# Patient Record
Sex: MALE | Race: WHITE | NOT HISPANIC OR LATINO | Employment: PART TIME | URBAN - NONMETROPOLITAN AREA
[De-identification: names, ages, dates, MRNs, and addresses within clinical notes are randomized per-mention and may not be internally consistent; named-entity substitution may affect disease eponyms.]

---

## 2017-01-09 RX ORDER — CLOPIDOGREL BISULFATE 75 MG/1
TABLET ORAL
Qty: 30 TABLET | Refills: 6 | Status: SHIPPED | OUTPATIENT
Start: 2017-01-09 | End: 2017-07-24 | Stop reason: SDUPTHER

## 2017-01-27 ENCOUNTER — LAB (OUTPATIENT)
Dept: LAB | Facility: HOSPITAL | Age: 62
End: 2017-01-27

## 2017-01-27 DIAGNOSIS — E08.22 DIABETES MELLITUS DUE TO UNDERLYING CONDITION WITH STAGE 1 CHRONIC KIDNEY DISEASE, WITHOUT LONG-TERM CURRENT USE OF INSULIN (HCC): Primary | ICD-10-CM

## 2017-01-27 DIAGNOSIS — I25.708 CORONARY ARTERY DISEASE INVOLVING CORONARY BYPASS GRAFT OF NATIVE HEART WITH OTHER FORMS OF ANGINA PECTORIS (HCC): ICD-10-CM

## 2017-01-27 DIAGNOSIS — I10 ESSENTIAL HYPERTENSION: ICD-10-CM

## 2017-01-27 DIAGNOSIS — I25.5 ISCHEMIC CARDIOMYOPATHY: ICD-10-CM

## 2017-01-27 DIAGNOSIS — E78.2 MIXED HYPERLIPIDEMIA: ICD-10-CM

## 2017-01-27 DIAGNOSIS — N18.1 DIABETES MELLITUS DUE TO UNDERLYING CONDITION WITH STAGE 1 CHRONIC KIDNEY DISEASE, WITHOUT LONG-TERM CURRENT USE OF INSULIN (HCC): Primary | ICD-10-CM

## 2017-01-27 LAB
ALBUMIN SERPL-MCNC: 4.6 G/DL (ref 3.4–4.8)
ALBUMIN/GLOB SERPL: 1.5 G/DL (ref 1.1–1.8)
ALP SERPL-CCNC: 69 U/L (ref 38–126)
ALT SERPL W P-5'-P-CCNC: 39 U/L (ref 21–72)
ANION GAP SERPL CALCULATED.3IONS-SCNC: 16 MMOL/L (ref 5–15)
ARTICHOKE IGE QN: 82 MG/DL (ref 1–129)
AST SERPL-CCNC: 22 U/L (ref 17–59)
BILIRUB SERPL-MCNC: 0.8 MG/DL (ref 0.2–1.3)
BUN BLD-MCNC: 16 MG/DL (ref 7–21)
BUN/CREAT SERPL: 21.6 (ref 7–25)
CALCIUM SPEC-SCNC: 9.8 MG/DL (ref 8.4–10.2)
CHLORIDE SERPL-SCNC: 97 MMOL/L (ref 95–110)
CHOLEST SERPL-MCNC: 160 MG/DL (ref 0–199)
CO2 SERPL-SCNC: 22 MMOL/L (ref 22–31)
CREAT BLD-MCNC: 0.74 MG/DL (ref 0.7–1.3)
GFR SERPL CREATININE-BSD FRML MDRD: 108 ML/MIN/1.73 (ref 56–130)
GLOBULIN UR ELPH-MCNC: 3 GM/DL (ref 2.3–3.5)
GLUCOSE BLD-MCNC: 272 MG/DL (ref 60–100)
HBA1C MFR BLD: 9.76 % (ref 4–5.6)
HDLC SERPL-MCNC: 47 MG/DL (ref 60–200)
LDLC/HDLC SERPL: 1.66 {RATIO} (ref 0–3.55)
POTASSIUM BLD-SCNC: 4.6 MMOL/L (ref 3.5–5.1)
PROT SERPL-MCNC: 7.6 G/DL (ref 6.3–8.6)
SODIUM BLD-SCNC: 135 MMOL/L (ref 137–145)
TRIGL SERPL-MCNC: 176 MG/DL (ref 20–199)

## 2017-01-27 PROCEDURE — 36415 COLL VENOUS BLD VENIPUNCTURE: CPT

## 2017-01-27 PROCEDURE — 80061 LIPID PANEL: CPT | Performed by: INTERNAL MEDICINE

## 2017-01-27 PROCEDURE — 83036 HEMOGLOBIN GLYCOSYLATED A1C: CPT | Performed by: INTERNAL MEDICINE

## 2017-01-27 PROCEDURE — 80162 ASSAY OF DIGOXIN TOTAL: CPT | Performed by: INTERNAL MEDICINE

## 2017-01-27 PROCEDURE — 80053 COMPREHEN METABOLIC PANEL: CPT | Performed by: INTERNAL MEDICINE

## 2017-01-30 ENCOUNTER — TRANSCRIBE ORDERS (OUTPATIENT)
Dept: LAB | Facility: HOSPITAL | Age: 62
End: 2017-01-30

## 2017-01-30 ENCOUNTER — APPOINTMENT (OUTPATIENT)
Dept: LAB | Facility: HOSPITAL | Age: 62
End: 2017-01-30

## 2017-01-30 DIAGNOSIS — I25.5 ISCHEMIC CARDIOMYOPATHY: Primary | ICD-10-CM

## 2017-01-30 LAB — DIGOXIN SERPL-MCNC: 0.9 NG/ML (ref 0.8–2)

## 2017-01-30 PROCEDURE — 36415 COLL VENOUS BLD VENIPUNCTURE: CPT | Performed by: INTERNAL MEDICINE

## 2017-01-30 PROCEDURE — 80162 ASSAY OF DIGOXIN TOTAL: CPT | Performed by: INTERNAL MEDICINE

## 2017-01-30 PROCEDURE — 84550 ASSAY OF BLOOD/URIC ACID: CPT | Performed by: INTERNAL MEDICINE

## 2017-04-05 ENCOUNTER — CLINICAL SUPPORT (OUTPATIENT)
Dept: CARDIOLOGY | Facility: CLINIC | Age: 62
End: 2017-04-05

## 2017-04-05 DIAGNOSIS — I25.5 ISCHEMIC CARDIOMYOPATHY: Primary | ICD-10-CM

## 2017-04-05 PROCEDURE — 93289 INTERROG DEVICE EVAL HEART: CPT | Performed by: INTERNAL MEDICINE

## 2017-04-06 NOTE — PROGRESS NOTES
ICD interrogation report on Andre Javier :    Mr. Javier is a 61 yr old male with Ischemic Cardiomyopathy s/p ICD placement on 11/14/2013. Pt has a Medtronic Protecta DR [id number]    Battery Voltage was adequate at 3.10 V and charge time 9.6 sec.    Patient was atrial paced 49.4% of the time. Bradycardia parameters; Mode AAIR/ DDDR  Tachycardia parameters were programmed in three zones. VT-1 at 167 bpm, VT-2 at 214 bpm, and VF at 188 bpm.    Atrial lead sensing was 5.1 mV and threshold 0.25 V @ 0.4 ms and impedence 532 ohms.  Ventricular sensing was 14.6 mV and threshold 0.75 V @ 0.4 ms and impedence 456 ohms. HVLI was 83 ohms.   3 supraventricular (longest 1 min, 50 sec) and 3 VT (longest 5 sec)    ICD functioning well and no changes in parameters made.

## 2017-06-10 RX ORDER — DIGOXIN 125 MCG
TABLET ORAL
Qty: 30 TABLET | Refills: 5 | Status: CANCELLED | OUTPATIENT
Start: 2017-06-10

## 2017-06-12 RX ORDER — DIGOXIN 125 MCG
125 TABLET ORAL
Qty: 30 TABLET | Refills: 7 | Status: SHIPPED | OUTPATIENT
Start: 2017-06-12 | End: 2019-01-31 | Stop reason: SDUPTHER

## 2017-07-17 ENCOUNTER — OFFICE VISIT (OUTPATIENT)
Dept: CARDIOLOGY | Facility: CLINIC | Age: 62
End: 2017-07-17

## 2017-07-17 VITALS
BODY MASS INDEX: 21.49 KG/M2 | WEIGHT: 129 LBS | HEART RATE: 72 BPM | DIASTOLIC BLOOD PRESSURE: 82 MMHG | HEIGHT: 65 IN | SYSTOLIC BLOOD PRESSURE: 142 MMHG

## 2017-07-17 DIAGNOSIS — N18.1 DIABETES MELLITUS DUE TO UNDERLYING CONDITION WITH STAGE 1 CHRONIC KIDNEY DISEASE, WITHOUT LONG-TERM CURRENT USE OF INSULIN (HCC): ICD-10-CM

## 2017-07-17 DIAGNOSIS — I25.5 ISCHEMIC CARDIOMYOPATHY: Primary | ICD-10-CM

## 2017-07-17 DIAGNOSIS — E08.22 DIABETES MELLITUS DUE TO UNDERLYING CONDITION WITH STAGE 1 CHRONIC KIDNEY DISEASE, WITHOUT LONG-TERM CURRENT USE OF INSULIN (HCC): ICD-10-CM

## 2017-07-17 DIAGNOSIS — I25.708 CORONARY ARTERY DISEASE INVOLVING CORONARY BYPASS GRAFT OF NATIVE HEART WITH OTHER FORMS OF ANGINA PECTORIS (HCC): ICD-10-CM

## 2017-07-17 DIAGNOSIS — R06.02 SOB (SHORTNESS OF BREATH): ICD-10-CM

## 2017-07-17 DIAGNOSIS — E78.2 MIXED HYPERLIPIDEMIA: ICD-10-CM

## 2017-07-17 DIAGNOSIS — I10 ESSENTIAL HYPERTENSION: ICD-10-CM

## 2017-07-17 PROCEDURE — 99214 OFFICE O/P EST MOD 30 MIN: CPT | Performed by: INTERNAL MEDICINE

## 2017-07-17 RX ORDER — GLIMEPIRIDE 1 MG/1
1 TABLET ORAL
COMMUNITY
End: 2018-01-17 | Stop reason: DRUGHIGH

## 2017-07-17 RX ORDER — GLIMEPIRIDE 2 MG/1
2 TABLET ORAL 2 TIMES DAILY
COMMUNITY

## 2017-07-17 NOTE — PROGRESS NOTES
[facility]  OFFICE NOTE    Andre Pettitp  62 y.o. male    07/17/2017  1. Ischemic cardiomyopathy    2. Coronary artery disease involving coronary bypass graft of native heart with other forms of angina pectoris    3. Essential hypertension    4. Mixed hyperlipidemia    5. Diabetes mellitus due to underlying condition with stage 1 chronic kidney disease, without long-term current use of insulin    6. SOB (shortness of breath)        Chief complaint - Follow-up ischemic cardiomyopathy      History of present Illness- 62-year-old gentleman with ischemic cardio myopathy last EF was 40%, he has shortness of breath NYHA class II, his last A1c was 9.2, since then he had seen Dr. Nieves and he had put him on glimepiride and his sugars are better able to do A1c today.  He is going to get couple of other labs dig level.  He denies any chest pain no dizziness or TIA symptoms.  No GI symptoms.              Allergies   Allergen Reactions   • Niacin And Related      Other reaction(s): Other (See Comments)  Flushing         Past Medical History:   Diagnosis Date   • Diabetes mellitus    • Hyperlipidemia    • Hypertension    • Ischemic cardiomyopathy    • Myocardial infarction          Past Surgical History:   Procedure Laterality Date   • CARDIAC CATHETERIZATION     • CARDIAC DEFIBRILLATOR PLACEMENT     • CARDIAC SURGERY     • CAROTID STENT     • CORONARY ARTERY BYPASS GRAFT     • INSERT / REPLACE / REMOVE PACEMAKER           No family history on file.      Social History     Social History   • Marital status: Single     Spouse name: N/A   • Number of children: N/A   • Years of education: N/A     Occupational History   • Not on file.     Social History Main Topics   • Smoking status: Never Smoker   • Smokeless tobacco: Never Used   • Alcohol use No   • Drug use: No   • Sexual activity: Defer     Other Topics Concern   • Not on file     Social History Narrative         Current Outpatient  "Prescriptions   Medication Sig Dispense Refill   • aspirin 81 MG EC tablet Take 81 mg by mouth Daily.     • carvedilol (COREG) 12.5 MG tablet Take 1 tablet by mouth 2 (Two) Times a Day With Meals. 60 tablet 6   • clopidogrel (PLAVIX) 75 MG tablet TAKE 1 TABLET BY MOUTH DAILY. 30 tablet 6   • CRESTOR 20 MG tablet Take 1 tablet by mouth Daily. 30 tablet 6   • digoxin (LANOXIN) 125 MCG tablet Take 1 tablet by mouth Daily. 30 tablet 7   • glimepiride (AMARYL) 1 MG tablet Take 1 mg by mouth Every Morning Before Breakfast.     • glimepiride (AMARYL) 2 MG tablet Take 2 mg by mouth Every Evening.     • metFORMIN (GLUCOPHAGE) 500 MG tablet Take 500 mg by mouth 2 (Two) Times a Day With Meals.     • valsartan (DIOVAN) 80 MG tablet Take 1 tablet by mouth 2 (Two) Times a Day. 60 tablet 10     No current facility-administered medications for this visit.          Review of Systems     Constitution: Denies any fatigue, fever or chills    HENT: Denies any headache, hearing impairment,     Eyes: Denies any blurring of vision, or photophobia     Cardivascular - As per history of present illness     Respiratory system-Shortness of breath NYHA class II   sleep apnea.     Endocrine:   history of hyperlipidemia, diabetes,                        Musculoskeletal:  No history of arthritis with musculoskeletal problems    Gastrointestinal: No nausea, vomiting, or melena    Genitourinary: No dysuria or hematuria    Neurological:   No history of seizure disorder, stroke, memory problems    Psychiatric/Behavioral:        No history of depression,  No history of bipolar disorder or schizophrenia     Hematological- no history of easy bruising or any bleeding diathesis            OBJECTIVE    /82  Pulse 72  Ht 65\" (165.1 cm)  Wt 129 lb (58.5 kg)  BMI 21.47 kg/m2      Physical Exam     Constitutional: is oriented to person, place, and time.     Skin-warm and dry, ICD site is okay    Well developed and nourished in no acute distress    "   Head: Normocephalic and atraumatic.     Eyes: Pupils are equal, round, and reactive to light.     Neck: Neck supple. No bruit in the carotids, no elevation of JVD    Cardiovascular: [place] in the fifth intercostal space   Regular rate, and  Rhythm,    S1 greater than S2, no S3 or S4, no gallop     Pulmonary/Chest:   Air  Entry is equal on both sides  No wheezing or crackles,      Abdominal: Soft.  No hepatosplenomegaly, bowel sounds are present    Musculoskeletal: No kyphoscoliosis, no significant thickening of the joints    Neurological: is alert and oriented to person, place, and time.    cranial nerve are intact .   No motor or sensory deficit    Extremities-no edema, no radial femoral delay      Psychiatric: He has a normal mood and affect.                  His behavior is normal.           Procedures      Lab Results   Component Value Date    WBC 8.1 02/15/2015    HGB 14.8 02/15/2015    HCT 40.5 02/15/2015    MCV 84.6 02/15/2015     02/15/2015     Lab Results   Component Value Date    GLUCOSE 272 (H) 01/27/2017    BUN 16 01/27/2017    CREATININE 0.74 01/27/2017    EGFRIFNONA 108 01/27/2017    BCR 21.6 01/27/2017    CO2 22.0 01/27/2017    CALCIUM 9.8 01/27/2017    ALBUMIN 4.60 01/27/2017    LABIL2 1.5 01/27/2017    AST 22 01/27/2017    ALT 39 01/27/2017     Lab Results   Component Value Date    CHOL 160 01/27/2017     Lab Results   Component Value Date    TRIG 176 01/27/2017     Lab Results   Component Value Date    HDL 47 (L) 01/27/2017     No results found for: LDLCALC  No results found for: LDL  No results found for: HDLLDLRATIO  No components found for: CHOLHDL  Lab Results   Component Value Date    HGBA1C 9.76 (H) 01/27/2017     No results found for: TSH, [id number]               A/P    Ischemic cardiomyopathy-on optimal medical therapy with Coreg, Diovan, digoxin and aspirin.  He takes Plavix also.  He has NYHA class II symptoms of dyspnea on exertion.  We'll check all the labs today.    History of  nonsustained VT-stable now since his EF is improved from 20% to 40%.  An ICD function is okay.    Diabetes-last A1c was 9.7 we'll check another A1c today as his blood sugars are been running good.    Hyperlipidemia on Crestor doing well.    Follow-up in 6 months              This document has been electronically signed by Ian Arora MD on July 17, 2017 8:42 AM       EMR Dragon/Transcription disclaimer:   Some of this note may be an electronic transcription/translation of spoken language to printed text. The electronic translation of spoken language may permit erroneous, or at times, nonsensical words or phrases to be inadvertently transcribed; Although I have reviewed the note for such errors, some may still exist.

## 2017-07-24 RX ORDER — CLOPIDOGREL BISULFATE 75 MG/1
TABLET ORAL
Qty: 30 TABLET | Refills: 5 | Status: SHIPPED | OUTPATIENT
Start: 2017-07-24 | End: 2018-01-20 | Stop reason: SDUPTHER

## 2017-12-06 ENCOUNTER — CLINICAL SUPPORT (OUTPATIENT)
Dept: CARDIOLOGY | Facility: CLINIC | Age: 62
End: 2017-12-06

## 2017-12-06 DIAGNOSIS — I25.5 ISCHEMIC CARDIOMYOPATHY: Primary | ICD-10-CM

## 2017-12-06 LAB
BH CV ECHO MEAS - ACS: 1.7 CM
BH CV ECHO MEAS - AO MAX PG (FULL): 2.2 MMHG
BH CV ECHO MEAS - AO MAX PG: 8.1 MMHG
BH CV ECHO MEAS - AO MEAN PG (FULL): 1 MMHG
BH CV ECHO MEAS - AO MEAN PG: 4 MMHG
BH CV ECHO MEAS - AO ROOT AREA (BSA CORRECTED): 1.8
BH CV ECHO MEAS - AO ROOT AREA: 6.6 CM^2
BH CV ECHO MEAS - AO ROOT DIAM: 2.9 CM
BH CV ECHO MEAS - AO V2 MAX: 142 CM/SEC
BH CV ECHO MEAS - AO V2 MEAN: 98.4 CM/SEC
BH CV ECHO MEAS - AO V2 VTI: 31.5 CM
BH CV ECHO MEAS - AVA(I,A): 1.7 CM^2
BH CV ECHO MEAS - AVA(I,D): 1.7 CM^2
BH CV ECHO MEAS - AVA(V,A): 1.9 CM^2
BH CV ECHO MEAS - AVA(V,D): 1.9 CM^2
BH CV ECHO MEAS - BSA(HAYCOCK): 1.6 M^2
BH CV ECHO MEAS - BSA: 1.6 M^2
BH CV ECHO MEAS - BZI_BMI: 21.5 KILOGRAMS/M^2
BH CV ECHO MEAS - BZI_METRIC_HEIGHT: 165.1 CM
BH CV ECHO MEAS - BZI_METRIC_WEIGHT: 58.5 KG
BH CV ECHO MEAS - EDV(CUBED): 148.9 ML
BH CV ECHO MEAS - EDV(TEICH): 135.3 ML
BH CV ECHO MEAS - EF(CUBED): 57 %
BH CV ECHO MEAS - EF(MOD-SP4): 55 %
BH CV ECHO MEAS - EF(TEICH): 48.3 %
BH CV ECHO MEAS - EPSS: 1.5 CM
BH CV ECHO MEAS - ESV(CUBED): 64 ML
BH CV ECHO MEAS - ESV(TEICH): 70 ML
BH CV ECHO MEAS - FS: 24.5 %
BH CV ECHO MEAS - IVS/LVPW: 0.67
BH CV ECHO MEAS - IVSD: 0.8 CM
BH CV ECHO MEAS - LA DIMENSION: 3.1 CM
BH CV ECHO MEAS - LA/AO: 1.1
BH CV ECHO MEAS - LV MASS(C)D: 200.4 GRAMS
BH CV ECHO MEAS - LV MASS(C)DI: 122.1 GRAMS/M^2
BH CV ECHO MEAS - LV MAX PG: 5.9 MMHG
BH CV ECHO MEAS - LV MEAN PG: 3 MMHG
BH CV ECHO MEAS - LV V1 MAX: 121 CM/SEC
BH CV ECHO MEAS - LV V1 MEAN: 76.2 CM/SEC
BH CV ECHO MEAS - LV V1 VTI: 24.1 CM
BH CV ECHO MEAS - LVIDD: 5.3 CM
BH CV ECHO MEAS - LVIDS: 4 CM
BH CV ECHO MEAS - LVOT AREA (M): 2.3 CM^2
BH CV ECHO MEAS - LVOT AREA: 2.3 CM^2
BH CV ECHO MEAS - LVOT DIAM: 1.7 CM
BH CV ECHO MEAS - LVPWD: 1.2 CM
BH CV ECHO MEAS - MR MAX PG: 61.2 MMHG
BH CV ECHO MEAS - MR MAX VEL: 391 CM/SEC
BH CV ECHO MEAS - MV A MAX VEL: 66.6 CM/SEC
BH CV ECHO MEAS - MV E MAX VEL: 105 CM/SEC
BH CV ECHO MEAS - MV E/A: 1.6
BH CV ECHO MEAS - PA MAX PG: 10.9 MMHG
BH CV ECHO MEAS - PA MEAN PG: 5 MMHG
BH CV ECHO MEAS - PA V2 MAX: 165 CM/SEC
BH CV ECHO MEAS - PA V2 MEAN: 98.4 CM/SEC
BH CV ECHO MEAS - PA V2 VTI: 32.9 CM
BH CV ECHO MEAS - RAP SYSTOLE: 10 MMHG
BH CV ECHO MEAS - RVDD: 2.1 CM
BH CV ECHO MEAS - RVSP: 38 MMHG
BH CV ECHO MEAS - SI(AO): 126.7 ML/M^2
BH CV ECHO MEAS - SI(CUBED): 51.7 ML/M^2
BH CV ECHO MEAS - SI(LVOT): 33.3 ML/M^2
BH CV ECHO MEAS - SI(TEICH): 39.8 ML/M^2
BH CV ECHO MEAS - SV(AO): 208.1 ML
BH CV ECHO MEAS - SV(CUBED): 84.9 ML
BH CV ECHO MEAS - SV(LVOT): 54.7 ML
BH CV ECHO MEAS - SV(TEICH): 65.3 ML
BH CV ECHO MEAS - TR MAX VEL: 264.3 CM/SEC
LV EF 2D ECHO EST: 50 %

## 2017-12-06 PROCEDURE — 93289 INTERROG DEVICE EVAL HEART: CPT | Performed by: INTERNAL MEDICINE

## 2017-12-06 NOTE — PROGRESS NOTES
Mr. Javier is a 61 yr old male with Ischemic Cardiomyopathy s/p ICD placement on 11/14/2013. Pt has a Medtronic Protecta DR [id number]    Battery status is adequate at 3.06V and charge time was 9.9 seconds.  The patient was atrial paced 63.8% and pacing mode was AAI R=DDDR  The tachycardia parameters were programmed in 3 zones VT at 167-1 88 bpm, FVT at 188-214bpm, VF >188 bpm  Atrial lead sensing was 4.9 mV and threshold 0.5 V at 0.4 ms and impedance 532 ohms  Ventricular lead sensing was 15.8 mV and threshold 0.7 5V at 0.4 ms and impedance 454 ohms.  No atrial episodes were noted.  Few short nonsustained ventricle ectopics were noted at longest lasting 2 seconds  No changes made.  ICD functioning well

## 2018-01-17 ENCOUNTER — OFFICE VISIT (OUTPATIENT)
Dept: CARDIOLOGY | Facility: CLINIC | Age: 63
End: 2018-01-17

## 2018-01-17 VITALS
HEART RATE: 86 BPM | DIASTOLIC BLOOD PRESSURE: 83 MMHG | WEIGHT: 125 LBS | SYSTOLIC BLOOD PRESSURE: 160 MMHG | BODY MASS INDEX: 20.83 KG/M2 | HEIGHT: 65 IN

## 2018-01-17 DIAGNOSIS — I25.5 ISCHEMIC CARDIOMYOPATHY: ICD-10-CM

## 2018-01-17 DIAGNOSIS — I10 ESSENTIAL HYPERTENSION: ICD-10-CM

## 2018-01-17 DIAGNOSIS — I25.708 CORONARY ARTERY DISEASE INVOLVING CORONARY BYPASS GRAFT OF NATIVE HEART WITH OTHER FORMS OF ANGINA PECTORIS (HCC): Primary | ICD-10-CM

## 2018-01-17 DIAGNOSIS — E78.2 MIXED HYPERLIPIDEMIA: ICD-10-CM

## 2018-01-17 PROCEDURE — 99213 OFFICE O/P EST LOW 20 MIN: CPT | Performed by: INTERNAL MEDICINE

## 2018-01-17 NOTE — PROGRESS NOTES
[facility]  OFFICE NOTE    Andre Javier  62 y.o. male    01/17/2018  1. Coronary artery disease involving coronary bypass graft of native heart with other forms of angina pectoris    2. Mixed hyperlipidemia    3. Essential hypertension    4. Ischemic cardiomyopathy        Chief complaint - Follow-up ischemic cardiomyopathy      History of present Illness- 62-year-old gentleman with ischemic cardio myopathy last EF -50%In July 2017.  He is doing better he is NYHA class I to class II symptoms of dyspnea on exertion.  He is a diabetic and is on amaryl and metformin.  He denies any chest pain or shortness of breath at rest.  His blood pressure is usually well controlled, today it is high.  Denies any GI symptoms or CNS symptoms          Allergies   Allergen Reactions   • Niacin And Related      Other reaction(s): Other (See Comments)  Flushing         Past Medical History:   Diagnosis Date   • Diabetes mellitus    • Hyperlipidemia    • Hypertension    • Ischemic cardiomyopathy    • Myocardial infarction          Past Surgical History:   Procedure Laterality Date   • CARDIAC CATHETERIZATION     • CARDIAC DEFIBRILLATOR PLACEMENT     • CARDIAC SURGERY     • CAROTID STENT     • CORONARY ARTERY BYPASS GRAFT     • INSERT / REPLACE / REMOVE PACEMAKER           History reviewed. No pertinent family history.      Social History     Social History   • Marital status: Single     Spouse name: N/A   • Number of children: N/A   • Years of education: N/A     Occupational History   • Not on file.     Social History Main Topics   • Smoking status: Never Smoker   • Smokeless tobacco: Never Used   • Alcohol use No   • Drug use: No   • Sexual activity: Defer     Other Topics Concern   • Not on file     Social History Narrative         Current Outpatient Prescriptions   Medication Sig Dispense Refill   • aspirin 81 MG EC tablet Take 81 mg by mouth Daily.     • carvedilol (COREG) 12.5 MG tablet Take 1 tablet  "by mouth 2 (Two) Times a Day With Meals. 60 tablet 6   • clopidogrel (PLAVIX) 75 MG tablet TAKE 1 TABLET BY MOUTH DAILY. 30 tablet 5   • CRESTOR 20 MG tablet Take 1 tablet by mouth Daily. 30 tablet 6   • digoxin (LANOXIN) 125 MCG tablet Take 1 tablet by mouth Daily. 30 tablet 7   • glimepiride (AMARYL) 2 MG tablet Take 2 mg by mouth Every Evening.     • metFORMIN (GLUCOPHAGE) 500 MG tablet Take 500 mg by mouth 2 (Two) Times a Day With Meals.     • valsartan (DIOVAN) 80 MG tablet Take 1 tablet by mouth 2 (Two) Times a Day. 60 tablet 10     No current facility-administered medications for this visit.          Review of Systems     Constitution: Denies any fatigue, fever or chills    HENT: Denies any headache, hearing impairment,     Eyes: Denies any blurring of vision, or photophobia     Cardivascular - As per history of present illness     Respiratory system-Shortness of breath NYHA class II   sleep apnea.     Endocrine:   history of hyperlipidemia, diabetes,                        Musculoskeletal:  No history of arthritis with musculoskeletal problems    Gastrointestinal: No nausea, vomiting, or melena    Genitourinary: No dysuria or hematuria    Neurological:   No history of seizure disorder, stroke, memory problems    Psychiatric/Behavioral:        No history of depression,  No history of bipolar disorder or schizophrenia     Hematological- no history of easy bruising or any bleeding diathesis            OBJECTIVE    /83  Pulse 86  Ht 165.1 cm (65\")  Wt 56.7 kg (125 lb)  BMI 20.8 kg/m2      Physical Exam     Constitutional: is oriented to person, place, and time.     Skin-warm and dry, ICD site is okay    Well developed and nourished in no acute distress      Head: Normocephalic and atraumatic.     Eyes: Pupils are equal, round, and reactive to light.     Neck: Neck supple. No bruit in the carotids, no elevation of JVD    Cardiovascular: [place] in the fifth intercostal space   Regular rate, and  Rhythm,    " S1 greater than S2,     Pulmonary/Chest:   Air  Entry is equal on both sides  No wheezing or crackles,      Abdominal: Soft.  No hepatosplenomegaly, bowel sounds are present    Musculoskeletal: No kyphoscoliosis, no significant thickening of the joints    Neurological: is alert and oriented to person, place, and time.    cranial nerve are intact .   No motor or sensory deficit    Extremities-no edema, no radial femoral delay      Psychiatric: He has a normal mood and affect.                  His behavior is normal.           Procedures      Lab Results   Component Value Date    WBC 8.1 02/15/2015    HGB 14.8 02/15/2015    HCT 40.5 02/15/2015    MCV 84.6 02/15/2015     02/15/2015     Lab Results   Component Value Date    GLUCOSE 272 (H) 01/27/2017    BUN 16 01/27/2017    CREATININE 0.74 01/27/2017    EGFRIFNONA 108 01/27/2017    BCR 21.6 01/27/2017    CO2 22.0 01/27/2017    CALCIUM 9.8 01/27/2017    ALBUMIN 4.60 01/27/2017    LABIL2 1.5 01/27/2017    AST 22 01/27/2017    ALT 39 01/27/2017     Lab Results   Component Value Date    CHOL 160 01/27/2017     Lab Results   Component Value Date    TRIG 176 01/27/2017     Lab Results   Component Value Date    HDL 47 (L) 01/27/2017     No results found for: LDLCALC  No results found for: LDL  No results found for: HDLLDLRATIO  No components found for: CHOLHDL  Lab Results   Component Value Date    HGBA1C 9.76 (H) 01/27/2017     No results found for: TSH, [id number]               A/P    Ischemic cardiomyopathy-on optimal medical therapy with Coreg, Diovan, digoxin and aspirin.  He takes Plavix also.  He has NYHA class II symptoms of dyspnea on exertion.      History of nonsustained VT-stable now since his EF is improved to 50%.  An ICD function is okay.    Diabetes-on oral therapy    Hyperlipidemia on Crestor doing well.    Follow-up in 6 months              This document has been electronically signed by Ian Arora MD on January 17, 2018 11:21 AM       EMR  Dragon/Transcription disclaimer:   Some of this note may be an electronic transcription/translation of spoken language to printed text. The electronic translation of spoken language may permit erroneous, or at times, nonsensical words or phrases to be inadvertently transcribed; Although I have reviewed the note for such errors, some may still exist.

## 2018-01-22 RX ORDER — CLOPIDOGREL BISULFATE 75 MG/1
TABLET ORAL
Qty: 30 TABLET | Refills: 6 | Status: SHIPPED | OUTPATIENT
Start: 2018-01-22 | End: 2018-10-19 | Stop reason: SDUPTHER

## 2018-04-23 RX ORDER — CARVEDILOL 12.5 MG/1
TABLET ORAL
Qty: 180 TABLET | Refills: 2 | Status: SHIPPED | OUTPATIENT
Start: 2018-04-23 | End: 2018-05-22 | Stop reason: SDUPTHER

## 2018-05-12 PROBLEM — J06.9 ACUTE UPPER RESPIRATORY INFECTION: Status: ACTIVE | Noted: 2018-05-12

## 2018-05-22 RX ORDER — CARVEDILOL 12.5 MG/1
12.5 TABLET ORAL 2 TIMES DAILY WITH MEALS
Qty: 180 TABLET | Refills: 2 | Status: SHIPPED | OUTPATIENT
Start: 2018-05-22 | End: 2018-11-19 | Stop reason: SDUPTHER

## 2018-06-06 ENCOUNTER — CLINICAL SUPPORT (OUTPATIENT)
Dept: CARDIOLOGY | Facility: CLINIC | Age: 63
End: 2018-06-06

## 2018-06-06 DIAGNOSIS — Z95.810 AICD (AUTOMATIC CARDIOVERTER/DEFIBRILLATOR) PRESENT: ICD-10-CM

## 2018-06-06 DIAGNOSIS — I25.5 ISCHEMIC CARDIOMYOPATHY: Primary | ICD-10-CM

## 2018-06-06 PROCEDURE — 93289 INTERROG DEVICE EVAL HEART: CPT | Performed by: NURSE PRACTITIONER

## 2018-06-06 NOTE — PROGRESS NOTES
Pacemaker Evaluation Report    June 6, 2018    Primary Cardiologist: Dr. Arora  Implanting MD: Dr. Wright  :Medtronic Model: [id number] Serial Number: [id number]  Implant date: 11/14/2013    Reason for evaluation:routine Office  Cardiac device indication(s): cardiomyopathy, ischemic    Battery  AMIRA: 3.03V   Last charge: 10.1 Sec on 4/13/18    Interrogation Results  Atrial sensing: P wave: 5.9 mV  Atrial capture: 0.75 V @ 0.40 ms   Atrial lead impedance: 532 ohms  Ventricular sensing: R wave: 16.8 mV  Ventricular capture: 0.75 V @ 0.40 ms  Ventricular lead impedance: right  456 ohms; HV  50/83 ohms    Parameters  Mode: AAIR<=>DDDR  Base Rate: 60/140    Diagnostic Data  Atrial paced: 61 % Ventricular paced: 4 %  Mode switch: %  AT/AF [place]: <0.1  AHR,VHR: NS-VT 4 episodes, SVT 2    Changes made: no changes    Conclusions: normal device function, 3 mo F/U recommended, unable to come because of insurance. He will make a 6 mo appt.    Assessment:   Diagnosis Plan   1. Ischemic cardiomyopathy     2. AICD (automatic cardioverter/defibrillator) present             This document has been electronically signed by DEVORAH Santiago on June 6, 2018 6:17 PM

## 2018-06-14 PROBLEM — E78.00 HYPERCHOLESTEROLEMIA: Status: ACTIVE | Noted: 2017-02-03

## 2018-06-14 PROBLEM — I25.10 CORONARY ARTERY DISEASE INVOLVING NATIVE CORONARY ARTERY OF NATIVE HEART WITHOUT ANGINA PECTORIS: Status: ACTIVE | Noted: 2017-02-03

## 2018-10-22 ENCOUNTER — OFFICE VISIT (OUTPATIENT)
Dept: CARDIOLOGY | Facility: CLINIC | Age: 63
End: 2018-10-22

## 2018-10-22 VITALS
WEIGHT: 127 LBS | HEART RATE: 87 BPM | SYSTOLIC BLOOD PRESSURE: 130 MMHG | BODY MASS INDEX: 21.16 KG/M2 | HEIGHT: 65 IN | DIASTOLIC BLOOD PRESSURE: 88 MMHG

## 2018-10-22 DIAGNOSIS — I10 ESSENTIAL HYPERTENSION: ICD-10-CM

## 2018-10-22 DIAGNOSIS — E78.2 MIXED HYPERLIPIDEMIA: ICD-10-CM

## 2018-10-22 DIAGNOSIS — I25.5 ISCHEMIC CARDIOMYOPATHY: Primary | ICD-10-CM

## 2018-10-22 DIAGNOSIS — I25.708 CORONARY ARTERY DISEASE INVOLVING CORONARY BYPASS GRAFT OF NATIVE HEART WITH OTHER FORMS OF ANGINA PECTORIS (HCC): ICD-10-CM

## 2018-10-22 DIAGNOSIS — E11.9 TYPE 2 DIABETES MELLITUS WITHOUT COMPLICATION, WITHOUT LONG-TERM CURRENT USE OF INSULIN (HCC): ICD-10-CM

## 2018-10-22 PROCEDURE — 99214 OFFICE O/P EST MOD 30 MIN: CPT | Performed by: INTERNAL MEDICINE

## 2018-10-22 RX ORDER — LOSARTAN POTASSIUM 50 MG/1
50 TABLET ORAL DAILY
Qty: 90 TABLET | Refills: 4 | Status: SHIPPED | OUTPATIENT
Start: 2018-10-22

## 2018-10-22 RX ORDER — CLOPIDOGREL BISULFATE 75 MG/1
TABLET ORAL
Qty: 30 TABLET | Refills: 7 | Status: SHIPPED | OUTPATIENT
Start: 2018-10-22 | End: 2018-12-18 | Stop reason: SDUPTHER

## 2018-10-22 NOTE — PROGRESS NOTES
[facility]  OFFICE NOTE    Andre Javier  63 y.o. male    10/22/2018  1. Ischemic cardiomyopathy    2. Coronary artery disease involving coronary bypass graft of native heart with other forms of angina pectoris (CMS/HCC)    3. Mixed hyperlipidemia    4. Essential hypertension    5. Type 2 diabetes mellitus without complication, without long-term current use of insulin (CMS/HCC)        Chief complaint - Follow-up ischemic cardiomyopathy      History of present Illness- 63-year-old gentleman with ischemic cardio myopathy last EF -50%In July 2017.  He is doing better he is NYHA class I to class II symptoms of dyspnea on exertion.  He is a diabetic and is on amaryl and metformin.  He denies any chest pain or shortness of breath at rest.  His blood pressure is usually well controlled, today it is high.  Denies any GI symptoms or CNS symptoms.  He had some blood work done by Dr. Nieves in February we get the rest of it sometime this week when he comes fasting          Allergies   Allergen Reactions   • Niacin And Related      Other reaction(s): Other (See Comments)  Flushing         Past Medical History:   Diagnosis Date   • Diabetes mellitus (CMS/HCC)    • Hyperlipidemia    • Hypertension    • Ischemic cardiomyopathy    • Myocardial infarction (CMS/HCC)          Past Surgical History:   Procedure Laterality Date   • CARDIAC CATHETERIZATION     • CARDIAC DEFIBRILLATOR PLACEMENT     • CARDIAC SURGERY     • CAROTID STENT     • CORONARY ARTERY BYPASS GRAFT     • INSERT / REPLACE / REMOVE PACEMAKER           History reviewed. No pertinent family history.      Social History     Social History   • Marital status: Single     Spouse name: N/A   • Number of children: N/A   • Years of education: N/A     Occupational History   • Not on file.     Social History Main Topics   • Smoking status: Never Smoker   • Smokeless tobacco: Never Used   • Alcohol use No   • Drug use: No   • Sexual activity: Defer  "    Other Topics Concern   • Not on file     Social History Narrative   • No narrative on file         Current Outpatient Prescriptions   Medication Sig Dispense Refill   • aspirin 81 MG EC tablet Take 81 mg by mouth Daily.     • carvedilol (COREG) 12.5 MG tablet Take 1 tablet by mouth 2 (Two) Times a Day With Meals. 180 tablet 2   • clopidogrel (PLAVIX) 75 MG tablet TAKE 1 TABLET BY MOUTH DAILY. 30 tablet 7   • CRESTOR 20 MG tablet Take 1 tablet by mouth Daily. 30 tablet 6   • digoxin (LANOXIN) 125 MCG tablet Take 1 tablet by mouth Daily. 30 tablet 7   • glimepiride (AMARYL) 2 MG tablet Take 2 mg by mouth Every Evening.     • metFORMIN (GLUCOPHAGE) 500 MG tablet Take 500 mg by mouth 2 (Two) Times a Day With Meals.     • losartan (COZAAR) 50 MG tablet Take 1 tablet by mouth Daily. 90 tablet 4     No current facility-administered medications for this visit.          Review of Systems     Constitution: Denies any fatigue, fever or chills    HENT: Denies any headache, hearing impairment,     Eyes: Denies any blurring of vision, or photophobia     Cardivascular - As per history of present illness     Respiratory system-Shortness of breath NYHA class II   sleep apnea.     Endocrine:   history of hyperlipidemia, diabetes,                        Musculoskeletal:  No history of arthritis with musculoskeletal problems    Gastrointestinal: No nausea, vomiting, or melena    Genitourinary: No dysuria or hematuria    Neurological:   No history of seizure disorder, stroke, memory problems    Psychiatric/Behavioral:        No history of depression,      Hematological- no history of easy bruising or any bleeding diathesis            OBJECTIVE    /88   Pulse 87   Ht 165.1 cm (65\")   Wt 57.6 kg (127 lb)   BMI 21.13 kg/m²       Physical Exam     Constitutional: is oriented to person, place, and time.     Skin-warm and dry, ICD site is okay    Well developed and nourished in no acute distress      Head: Normocephalic and " atraumatic.     Eyes: Pupils are equal    Neck: Neck supple. No bruit in the carotids,     Cardiovascular: [place] in the fifth intercostal space   Regular rate, and  Rhythm,    S1 greater than S2,     Pulmonary/Chest:   Air  Entry is equal on both sides  No wheezing or crackles,      Abdominal: Soft.  No hepatosplenomegaly, bowel sounds are present    Musculoskeletal: No kyphoscoliosis, no significant thickening of the joints    Neurological: is alert and oriented to person, place, and time.    cranial nerve are intact .   No motor or sensory deficit    Extremities-no edema, no radial femoral delay      Psychiatric: He has a normal mood and affect.                  His behavior is normal.           Procedures      Lab Results   Component Value Date    WBC 8.1 02/15/2015    HGB 14.8 02/15/2015    HCT 40.5 02/15/2015    MCV 84.6 02/15/2015     02/15/2015     Lab Results   Component Value Date    GLUCOSE 272 (H) 01/27/2017    BUN 16 01/27/2017    CREATININE 0.74 01/27/2017    EGFRIFNONA 108 01/27/2017    BCR 21.6 01/27/2017    CO2 22.0 01/27/2017    CALCIUM 9.8 01/27/2017    ALBUMIN 4.60 01/27/2017    AST 22 01/27/2017    ALT 39 01/27/2017     Lab Results   Component Value Date    CHOL 160 01/27/2017     Lab Results   Component Value Date    TRIG 176 01/27/2017     Lab Results   Component Value Date    HDL 47 (L) 01/27/2017     No components found for: LDLCALC  Lab Results   Component Value Date    LDL 82 01/27/2017     No results found for: HDLLDLRATIO  No components found for: CHOLHDL  Lab Results   Component Value Date    HGBA1C 9.76 (H) 01/27/2017     No results found for: TSH, [id number]               A/P    Ischemic cardiomyopathy-on optimal medical therapy with Coreg, losartan, digoxin and aspirin.  He takes Plavix also.  He has NYHA class II symptoms of dyspnea on exertion.      History of nonsustained VT-stable now since his EF is improved to 50%.  An ICD function is okay.    Diabetes-on oral  therapy    Hyperlipidemia on Crestor doing well.  Will do the blood work of lipid panel, hemoglobin A1c and dig level    Follow-up in 6 months              This document has been electronically signed by Ian Arora MD on October 22, 2018 10:26 AM       EMR Dragon/Transcription disclaimer:   Some of this note may be an electronic transcription/translation of spoken language to printed text. The electronic translation of spoken language may permit erroneous, or at times, nonsensical words or phrases to be inadvertently transcribed; Although I have reviewed the note for such errors, some may still exist.

## 2018-10-23 ENCOUNTER — LAB REQUISITION (OUTPATIENT)
Dept: LAB | Facility: HOSPITAL | Age: 63
End: 2018-10-23

## 2018-10-23 ENCOUNTER — APPOINTMENT (OUTPATIENT)
Dept: LAB | Facility: HOSPITAL | Age: 63
End: 2018-10-23

## 2018-10-23 DIAGNOSIS — I10 ESSENTIAL (PRIMARY) HYPERTENSION: ICD-10-CM

## 2018-10-23 DIAGNOSIS — I25.5 ISCHEMIC CARDIOMYOPATHY: ICD-10-CM

## 2018-10-23 PROCEDURE — 36415 COLL VENOUS BLD VENIPUNCTURE: CPT | Performed by: INTERNAL MEDICINE

## 2018-11-19 RX ORDER — CARVEDILOL 12.5 MG/1
TABLET ORAL
Qty: 180 TABLET | Refills: 2 | Status: SHIPPED | OUTPATIENT
Start: 2018-11-19 | End: 2019-08-20 | Stop reason: SDUPTHER

## 2018-12-04 NOTE — PROGRESS NOTES
Pacemaker Evaluation Report    December 5, 2018    Primary Cardiologist: Dr. Arora  Implanting MD: Dr. Wright  :Medtronic Model: [id number] Serial Number: [id number]  Implant date: 11/14/2013     Reason for evaluation:routine, ICD, Office  Cardiac device indication(s): cardiomyopathy, ischemic    Battery  AMIRA: 3.01 V   Last charge: 10.3 sec on 10/13/18    Interrogation Results  Atrial sensing: P wave: 5.3 mV  Atrial capture: 0.5 V @ 0.4 ms   Atrial lead impedance: 570 ohms  Ventricular sensing: R wave: 16.5 mV  Ventricular capture: 0.75 V @ 0.4 ms  Ventricular lead impedance: right  494 ohms; HV:  54/89 ohms    Parameters  Mode: AAIR<=>DDDR  Base Rate: 60/140    Diagnostic Data  Atrial paced: 69.6 %   Ventricular paced:8%  Mode switch: 0%  AT/AF [place]: <0.1%  AHR: 0  VHR: 0    Changes made: no changes    Conclusions: normal device function and Follow up in 3 months    Assessment:  1. Ischemic cardiomyopathy    2. AICD (automatic cardioverter/defibrillator) present            This document has been electronically signed by DEVORAH Santiago on December 5, 2018 11:52 AM

## 2018-12-05 ENCOUNTER — CLINICAL SUPPORT (OUTPATIENT)
Dept: CARDIOLOGY | Facility: CLINIC | Age: 63
End: 2018-12-05

## 2018-12-05 DIAGNOSIS — I25.5 ISCHEMIC CARDIOMYOPATHY: Primary | ICD-10-CM

## 2018-12-05 DIAGNOSIS — Z95.810 AICD (AUTOMATIC CARDIOVERTER/DEFIBRILLATOR) PRESENT: Chronic | ICD-10-CM

## 2018-12-05 PROCEDURE — 93289 INTERROG DEVICE EVAL HEART: CPT | Performed by: NURSE PRACTITIONER

## 2018-12-18 RX ORDER — CLOPIDOGREL BISULFATE 75 MG/1
75 TABLET ORAL DAILY
Qty: 30 TABLET | Refills: 7 | Status: CANCELLED | OUTPATIENT
Start: 2018-12-18

## 2018-12-18 RX ORDER — CLOPIDOGREL BISULFATE 75 MG/1
75 TABLET ORAL DAILY
Qty: 90 TABLET | Refills: 3 | Status: SHIPPED | OUTPATIENT
Start: 2018-12-18

## 2019-01-31 RX ORDER — DIGOXIN 125 MCG
125 TABLET ORAL
Qty: 30 TABLET | Refills: 6 | Status: SHIPPED | OUTPATIENT
Start: 2019-01-31 | End: 2019-08-20 | Stop reason: SDUPTHER

## 2019-05-31 ENCOUNTER — OFFICE VISIT (OUTPATIENT)
Dept: CARDIOLOGY | Facility: CLINIC | Age: 64
End: 2019-05-31

## 2019-05-31 VITALS
SYSTOLIC BLOOD PRESSURE: 120 MMHG | WEIGHT: 127 LBS | BODY MASS INDEX: 21.16 KG/M2 | DIASTOLIC BLOOD PRESSURE: 78 MMHG | HEART RATE: 85 BPM | HEIGHT: 65 IN

## 2019-05-31 DIAGNOSIS — Z95.810 AICD (AUTOMATIC CARDIOVERTER/DEFIBRILLATOR) PRESENT: Chronic | ICD-10-CM

## 2019-05-31 DIAGNOSIS — I10 ESSENTIAL HYPERTENSION: ICD-10-CM

## 2019-05-31 DIAGNOSIS — E11.65 TYPE 2 DIABETES MELLITUS WITH HYPERGLYCEMIA, WITHOUT LONG-TERM CURRENT USE OF INSULIN (HCC): ICD-10-CM

## 2019-05-31 DIAGNOSIS — R09.89 BRUIT OF LEFT CAROTID ARTERY: ICD-10-CM

## 2019-05-31 DIAGNOSIS — E78.2 MIXED HYPERLIPIDEMIA: ICD-10-CM

## 2019-05-31 DIAGNOSIS — I25.5 ISCHEMIC CARDIOMYOPATHY: Primary | ICD-10-CM

## 2019-05-31 PROCEDURE — 99214 OFFICE O/P EST MOD 30 MIN: CPT | Performed by: INTERNAL MEDICINE

## 2019-05-31 NOTE — PROGRESS NOTES
[facility] Cardiology  OFFICE NOTE    Andre Javier  64 y.o. male    05/31/2019  1. Ischemic cardiomyopathy    2. AICD (automatic cardioverter/defibrillator) present    3. Mixed hyperlipidemia    4. Essential hypertension    5. Type 2 diabetes mellitus with hyperglycemia, without long-term current use of insulin (CMS/HCC)    6. Bruit of left carotid artery        Chief complaint - Follow-up ischemic cardiomyopathy      History of present Illness- 64-year-old gentleman with ischemic cardio myopathy last EF -50%In July 2017.  He is doing better he is NYHA class I to class II symptoms of dyspnea on exertion.  He is a diabetic and is on amaryl and metformin.  He denies any chest pain or shortness of breath at rest.  His blood pressure is usually well controlled,Denies any GI symptoms or CNS symptoms.  He had some blood work done by Dr. Nieves .  He is otherwise doing well no complaints there is a new bruit on the left carotid we will do a carotid duplex          Allergies   Allergen Reactions   • Niacin And Related      Other reaction(s): Other (See Comments)  Flushing         Past Medical History:   Diagnosis Date   • Diabetes mellitus (CMS/HCC)    • Hyperlipidemia    • Hypertension    • Ischemic cardiomyopathy    • Myocardial infarction (CMS/HCC)          Past Surgical History:   Procedure Laterality Date   • CARDIAC CATHETERIZATION     • CARDIAC DEFIBRILLATOR PLACEMENT     • CARDIAC SURGERY     • CAROTID STENT     • CORONARY ARTERY BYPASS GRAFT     • INSERT / REPLACE / REMOVE PACEMAKER           No family history on file.      Social History     Socioeconomic History   • Marital status: Single     Spouse name: Not on file   • Number of children: Not on file   • Years of education: Not on file   • Highest education level: Not on file   Tobacco Use   • Smoking status: Never Smoker   • Smokeless tobacco: Never Used   Substance and Sexual Activity   • Alcohol use: No   • Drug use: No   • Sexual  "activity: Defer         Current Outpatient Medications   Medication Sig Dispense Refill   • aspirin 81 MG EC tablet Take 81 mg by mouth Daily.     • carvedilol (COREG) 12.5 MG tablet TAKE 1 TABLET BY MOUTH 2 TIMES A DAY WITH MEALS. 180 tablet 2   • clopidogrel (PLAVIX) 75 MG tablet Take 1 tablet by mouth Daily. 90 tablet 3   • CRESTOR 20 MG tablet Take 1 tablet by mouth Daily. 30 tablet 6   • digoxin (LANOXIN) 125 MCG tablet Take 1 tablet by mouth Daily. 30 tablet 6   • glimepiride (AMARYL) 2 MG tablet Take 2 mg by mouth Every Evening.     • losartan (COZAAR) 50 MG tablet Take 1 tablet by mouth Daily. 90 tablet 4   • metFORMIN (GLUCOPHAGE) 500 MG tablet Take 500 mg by mouth 2 (Two) Times a Day With Meals.       No current facility-administered medications for this visit.          Review of Systems     Constitution: Denies any fatigue, fever or chills    HENT: Denies any headache, hearing impairment,     Eyes: Denies any blurring of vision, or photophobia     Cardivascular - As per history of present illness     Respiratory system-Shortness of breath NYHA class II   sleep apnea.     Endocrine:   history of hyperlipidemia, diabetes,                        Musculoskeletal:  No history of arthritis with musculoskeletal problems    Gastrointestinal: No nausea, vomiting, or melena    Genitourinary: No dysuria or hematuria    Neurological:   No history of seizure disorder, stroke, memory problems    Psychiatric/Behavioral:        No history of depression,      Hematological- no history of easy bruising or any bleeding diathesis            OBJECTIVE    /78   Pulse 85   Ht 165.1 cm (65\")   Wt 57.6 kg (127 lb)   BMI 21.13 kg/m²       Physical Exam     Constitutional: is oriented to person, place, and time.     Skin-warm and dry, ICD site is okay    Well developed and nourished in no acute distress      Head: Normocephalic and atraumatic.     Eyes: Pupils are equal    Neck: Neck supple.  Small bruit on the left " carotid    Cardiovascular: [place] in the fifth intercostal space   Regular rate, and  Rhythm,    S1 greater than S2,     Pulmonary/Chest:   Air  Entry is equal on both sides  No wheezing or crackles,      Abdominal: Soft.  No hepatosplenomegaly, bowel sounds are present    Musculoskeletal: No kyphoscoliosis, no significant thickening of the joints    Neurological: is alert and oriented to person, place, and time.    cranial nerve are intact .   No motor or sensory deficit    Extremities-no edema, no radial femoral delay      Psychiatric: He has a normal mood and affect.                  His behavior is normal.           Procedures      Lab Results   Component Value Date    WBC 8.1 02/15/2015    HGB 14.8 02/15/2015    HCT 40.5 02/15/2015    MCV 84.6 02/15/2015     02/15/2015     Lab Results   Component Value Date    GLUCOSE 272 (H) 01/27/2017    BUN 11 02/14/2018    CREATININE 0.8 02/14/2018    EGFRIFNONA 108 01/27/2017    BCR 21.6 01/27/2017    CO2 22.0 01/27/2017    CALCIUM 9.2 02/14/2018    ALBUMIN 4.5 02/14/2018    AST 19 02/14/2018    ALT 33 02/14/2018     Lab Results   Component Value Date    CHOL 160 01/27/2017     Lab Results   Component Value Date    TRIG 176 01/27/2017     Lab Results   Component Value Date    HDL 47 (L) 01/27/2017     No components found for: LDLCALC  Lab Results   Component Value Date    LDL 82 01/27/2017     No results found for: HDLLDLRATIO  No components found for: CHOLHDL  Lab Results   Component Value Date    HGBA1C 9.76 (H) 01/27/2017     No results found for: TSH, K4XDZKR               A/P    Ischemic cardiomyopathy-on optimal medical therapy with Coreg, losartan, digoxin and aspirin.  He takes Plavix also.  He has NYHA class II symptoms of dyspnea on exertion.     Bruit on the left carotid we will do a carotid duplex    History of nonsustained VT-stable now since his EF is improved to 50%.  An ICD function is okay.    Diabetes-on oral therapy    Hyperlipidemia on Crestor  doing well.      Follow-up in 6 months              This document has been electronically signed by Ian Arora MD on May 31, 2019 9:29 AM       EMR Dragon/Transcription disclaimer:   Some of this note may be an electronic transcription/translation of spoken language to printed text. The electronic translation of spoken language may permit erroneous, or at times, nonsensical words or phrases to be inadvertently transcribed; Although I have reviewed the note for such errors, some may still exist.

## 2019-06-06 ENCOUNTER — TELEPHONE (OUTPATIENT)
Dept: CARDIOLOGY | Facility: CLINIC | Age: 64
End: 2019-06-06

## 2019-06-06 NOTE — TELEPHONE ENCOUNTER
Gave pt carotid results        ----- Message from Lashell Marks RN sent at 6/6/2019 10:07 AM CDT -----  Regarding: res...  Carotid looks ok per juan

## 2019-07-03 ENCOUNTER — CLINICAL SUPPORT (OUTPATIENT)
Dept: CARDIOLOGY | Facility: CLINIC | Age: 64
End: 2019-07-03

## 2019-07-03 DIAGNOSIS — Z95.810 AICD (AUTOMATIC CARDIOVERTER/DEFIBRILLATOR) PRESENT: Chronic | ICD-10-CM

## 2019-07-03 DIAGNOSIS — I25.5 ISCHEMIC CARDIOMYOPATHY: Primary | ICD-10-CM

## 2019-07-03 PROCEDURE — 93289 INTERROG DEVICE EVAL HEART: CPT | Performed by: NURSE PRACTITIONER

## 2019-07-03 NOTE — PROGRESS NOTES
Pacemaker Evaluation Report    July 3, 2019    Primary Cardiologist: Dr. Arora  Implanting MD: Dr. Wright  :Medtronic Model: [id number] Serial Number: [id number]  Implant date: 11/14/2013     Reason for evaluation:routine, ICD, Office  Cardiac device indication(s): cardiomyopathy, ischemic    Battery  AMIRA: 2.97 V   Last charge: 10.6 sec on 4/14/2019    Interrogation Results  Atrial sensing: P wave: 4.1 mV  Atrial capture: 1.5 V @ 0.4 ms   Atrial lead impedance: 532 ohms  Ventricular sensing: R wave: 15.4 mV  Ventricular capture: 2.00 V @ 0.4 ms  Ventricular lead impedance: right  456 ohms; HV:  54/95 ohms    Parameters  Mode: AAIR<=>DDDR  Base Rate: 60/140    Diagnostic Data  Atrial paced: 65.7 %   Ventricular paced: 3.9%  Mode switch: 0%  AT/AF [place]: 0%  AHR: 0  VHR: 4    Changes made: no changes    Conclusions: normal device function and Follow up in 3 months    Assessment:  1. Ischemic cardiomyopathy    2. AICD (automatic cardioverter/defibrillator) present            This document has been electronically signed by DEVORAH Santiago on July 3, 2019 1:28 PM

## 2019-08-20 RX ORDER — DIGOXIN 125 MCG
125 TABLET ORAL
Qty: 90 TABLET | Refills: 3 | Status: SHIPPED | OUTPATIENT
Start: 2019-08-20

## 2019-08-20 RX ORDER — CARVEDILOL 12.5 MG/1
12.5 TABLET ORAL 2 TIMES DAILY WITH MEALS
Qty: 180 TABLET | Refills: 3 | Status: SHIPPED | OUTPATIENT
Start: 2019-08-20 | End: 2022-04-26

## 2019-09-04 ENCOUNTER — CLINICAL SUPPORT (OUTPATIENT)
Dept: CARDIOLOGY | Facility: CLINIC | Age: 64
End: 2019-09-04

## 2019-09-04 DIAGNOSIS — Z95.810 AICD (AUTOMATIC CARDIOVERTER/DEFIBRILLATOR) PRESENT: Chronic | ICD-10-CM

## 2019-09-04 DIAGNOSIS — I25.5 ISCHEMIC CARDIOMYOPATHY: Primary | ICD-10-CM

## 2019-09-04 PROCEDURE — 93289 INTERROG DEVICE EVAL HEART: CPT | Performed by: NURSE PRACTITIONER

## 2019-09-04 NOTE — PROGRESS NOTES
Pacemaker Evaluation Report    September 4, 2019    Primary Cardiologist: Dr. Arora  Implanting MD: Dr. Wright  :Medtronic Model: [id number] Serial Number: [id number]  Implant date: 11/14/2013     Reason for evaluation:routine, ICD, Office  Cardiac device indication(s): cardiomyopathy, ischemic    Battery  AMIRA: 2.95 V   Last charge: 10.6 sec on 4/14/2019    Interrogation Results  Atrial sensing: P wave: 4.8 mV  Atrial capture: 1.5 V @ 0.4 ms   Atrial lead impedance: 532 ohms  Ventricular sensing: R wave: 16 mV  Ventricular capture: 2.00 V @ 0.4 ms  Ventricular lead impedance: right  456 ohms; HV:  54/94 ohms    Parameters  Mode: AAIR<=>DDDR  Base Rate: 60/140    Diagnostic Data  Atrial paced: 68.6 %   Ventricular paced: 4.6%  Mode switch: 0%  AT/AF [place]: 0%  AHR: 0  VHR: 0    Changes made: no changes    Conclusions: normal device function and Follow up in 3 months    Assessment:  1. Ischemic cardiomyopathy    2. AICD (automatic cardioverter/defibrillator) present            This document has been electronically signed by DEVORAH Santiago on September 4, 2019 12:33 PM

## 2020-02-05 ENCOUNTER — CLINICAL SUPPORT (OUTPATIENT)
Dept: CARDIOLOGY | Facility: CLINIC | Age: 65
End: 2020-02-05

## 2020-02-05 DIAGNOSIS — Z95.810 AICD (AUTOMATIC CARDIOVERTER/DEFIBRILLATOR) PRESENT: Primary | Chronic | ICD-10-CM

## 2020-02-05 DIAGNOSIS — I25.5 ISCHEMIC CARDIOMYOPATHY: ICD-10-CM

## 2020-02-05 PROCEDURE — 93289 INTERROG DEVICE EVAL HEART: CPT | Performed by: NURSE PRACTITIONER

## 2020-02-05 NOTE — PROGRESS NOTES
Pacemaker Evaluation Report    September 4, 2019    Primary Cardiologist: Dr. Ortiz  Implanting MD: Dr. Wright  :Medtronic Model: [id number] Serial Number: [id number]  Implant date: 11/14/2013     Reason for evaluation: routine, ICD, Office  Cardiac device indication(s): cardiomyopathy, ischemic    Battery  AMIRA: 2.86 V   Last charge: 10.9 sec on 10/14/2019    Interrogation Results  Atrial sensing: P wave: 4.9 mV  Atrial capture: 1.5 V @ 0.4 ms   Atrial lead impedance: 513 ohms  Ventricular sensing: R wave: 14.4 mV  Ventricular capture: 2.00 V @ 0.4 ms  Ventricular lead impedance: right  456 ohms; HV:  55/93 ohms    Parameters  Mode: AAIR<=>DDDR  Base Rate: 60/140    Diagnostic Data  Atrial paced: 66.9 %   Ventricular paced: 4.3%  Mode switch: 0%  AT/AF [place]: <0.1%  AHR: 0  VHR: 2 (> 4 beats)    Changes made: no changes    Conclusions: normal device function and Follow up in 3 months    Assessment:  1. AICD (automatic cardioverter/defibrillator) present    2. Ischemic cardiomyopathy            This document has been electronically signed by DEVORAH Talavera on February 5, 2020 5:15 PM          This document has been electronically signed by DEVORAH Talavera on February 5, 2020 5:15 PM

## 2020-03-02 ENCOUNTER — OFFICE VISIT (OUTPATIENT)
Dept: CARDIOLOGY | Facility: CLINIC | Age: 65
End: 2020-03-02

## 2020-03-02 VITALS
OXYGEN SATURATION: 99 % | HEART RATE: 84 BPM | HEIGHT: 65 IN | WEIGHT: 124 LBS | DIASTOLIC BLOOD PRESSURE: 70 MMHG | SYSTOLIC BLOOD PRESSURE: 136 MMHG | BODY MASS INDEX: 20.66 KG/M2

## 2020-03-02 DIAGNOSIS — I10 ESSENTIAL HYPERTENSION: ICD-10-CM

## 2020-03-02 DIAGNOSIS — E78.2 MIXED HYPERLIPIDEMIA: ICD-10-CM

## 2020-03-02 DIAGNOSIS — I25.5 ISCHEMIC CARDIOMYOPATHY: Primary | ICD-10-CM

## 2020-03-02 PROCEDURE — 99213 OFFICE O/P EST LOW 20 MIN: CPT | Performed by: INTERNAL MEDICINE

## 2020-03-02 NOTE — PROGRESS NOTES
Andre Garcia Concepcion  64 y.o. male    03/02/2020  Chief Complaint   Patient presents with   • Follow-up       History of Present Illness history of ischemic heart disease and cardiomyopathy.      -        SUBJECTIVE   here doing well.  He is having no shortness of air no chest pain.  He has had no ICD firing.  His last echo done approximately 3 years ago had a normal ejection fraction.  He is interested in coming off of his medicines.  Status post coronary bypass grafting surgery with no complaints.  Allergies   Allergen Reactions   • Niacin And Related      Other reaction(s): Other (See Comments)  Flushing         Past Medical History:   Diagnosis Date   • Diabetes mellitus (CMS/HCC)    • Hyperlipidemia    • Hypertension    • Ischemic cardiomyopathy    • Myocardial infarction (CMS/HCC)          Past Surgical History:   Procedure Laterality Date   • CARDIAC CATHETERIZATION     • CARDIAC DEFIBRILLATOR PLACEMENT     • CARDIAC SURGERY     • CAROTID STENT     • CORONARY ARTERY BYPASS GRAFT     • INSERT / REPLACE / REMOVE PACEMAKER           History reviewed. No pertinent family history.      Social History     Socioeconomic History   • Marital status: Single     Spouse name: Not on file   • Number of children: Not on file   • Years of education: Not on file   • Highest education level: Not on file   Tobacco Use   • Smoking status: Never Smoker   • Smokeless tobacco: Never Used   Substance and Sexual Activity   • Alcohol use: No   • Drug use: No   • Sexual activity: Defer         Prior to Admission medications    Medication Sig Start Date End Date Taking? Authorizing Provider   aspirin 81 MG EC tablet Take 81 mg by mouth Daily.   Yes Provider, MD Nette   carvedilol (COREG) 12.5 MG tablet Take 1 tablet by mouth 2 (Two) Times a Day With Meals. 8/20/19  Yes Ian Arora MD   clopidogrel (PLAVIX) 75 MG tablet Take 1 tablet by mouth Daily. 12/18/18  Yes Ian Arora MD   CRESTOR 20 MG tablet Take 1 tablet  "by mouth Daily. 10/7/16  Yes Ian Arora MD   digoxin (LANOXIN) 125 MCG tablet Take 1 tablet by mouth Daily. 8/20/19  Yes Ian Arora MD   glimepiride (AMARYL) 2 MG tablet Take 2 mg by mouth Every Evening.   Yes Nette Jim MD   losartan (COZAAR) 50 MG tablet Take 1 tablet by mouth Daily. 10/22/18  Yes Ian Arora MD   metFORMIN (GLUCOPHAGE) 500 MG tablet Take 500 mg by mouth 2 (Two) Times a Day With Meals.   Yes ProviderNette MD         OBJECTIVE    /70 (BP Location: Left arm, Patient Position: Sitting, Cuff Size: Adult)   Pulse 84   Ht 165.1 cm (65\")   Wt 56.2 kg (124 lb)   SpO2 99%   BMI 20.63 kg/m²         Review of Systems     Constitutional:  Denies recent weight loss, weight gain, fever or chills, no change in exercise tolerance     HENT:  Denies any hearing loss, epistaxis, hoarseness, or difficulty speaking.     Eyes: Wears eyeglasses or contact lenses     Respiratory:  Denies dyspnea with exertion,no cough, wheezing, or hemoptysis.     Cardiovascular: Negative for palpations, chest pain, orthopnea, PND, peripheral edema, syncope, or claudication.     Gastrointestinal:  Denies change in bowel habits, dyspepsia, ulcer disease, hematochezia, or melena.     Endocrine: Negative for cold intolerance, heat intolerance, polydipsia, polyphagia and polyuria. Denies any history of weight change, heat/cold intolerance, polydipsia, polyuria     Genitourinary: Negative.      Musculoskeletal: Denies any history of arthritic symptoms or back problems     Skin:  Denies any change in hair or nails, rashes, or skin lesions.     Allergic/Immunologic: Negative.  Negative for environmental allergies, food allergies and immunocompromised state.     Neurological:  Denies any history of recurrent headaches, strokes, TIA, or seizure disorder.     Hematological: Denies any food allergies, seasonal allergies, bleeding disorders, or lymphadenopathy.     Psychiatric/Behavioral: " Denies any history of depression, substance abuse, or change in cognitive function.         Physical Exam     Constitutional: Cooperative, alert and oriented, well-developed, well-nourished, in no acute distress.     HENT:   Head: Normocephalic, normal hair patterns, no masses or tenderness.  Ears, Nose, and Throat: No gross abnormalities. No pallor or cyanosis. Dentition good.   Eyes: EOMS intact, PERRL, conjunctivae and lids unremarkable. Fundoscopic exam and visual fields not performed.   Neck: No palpable masses or adenopathy, no thyromegaly, no JVD, carotid pulses are full and equal bilaterally and without  Bruits.     Cardiovascular: Regular rhythm, S1 and S2 normal, no S3 or S4. Apical impulse not displaced. No murmurs, gallops, or rubs detected.     Pulmonary/Chest: Chest: normal symmetry, no tenderness to palpation, normal respiratory excursion, no intercostal retraction, no use of accessory muscles.            Pulmonary: Normal breath sounds. No rales or ronchi.    Abdominal: Abdomen soft, bowel sounds normoactive, no masses, no hepatosplenomegaly, non-tender, no bruits.     Musculoskeletal: No deformities, clubbing, cyanosis, erythema, or edema observed. There are no spinal abnormalities noted. Normal muscle strength and tone. Pulses full and equal in all extremities, no bruits auscultated.     Neurological: No gross motor or sensory deficits noted, affect appropriate, oriented to time, person, place.     Skin: Warm and dry to the touch, no apparent skin lesions or masses noted.     Psychiatric: He has a normal mood and affect. His behavior is normal. Judgment and thought content normal.         Procedures      Lab Results   Component Value Date    WBC 7.5 11/11/2019    HGB 15.2 11/11/2019    HCT 43.3 11/11/2019    MCV 87 11/11/2019     11/11/2019     Lab Results   Component Value Date    GLUCOSE 272 (H) 01/27/2017    BUN 11 02/14/2018    CREATININE 0.8 02/14/2018    EGFRIFNONA 108 01/27/2017     BCR 21.6 01/27/2017    CO2 22.0 01/27/2017    CALCIUM 9.2 02/14/2018    ALBUMIN 4.5 02/14/2018    AST 19 02/14/2018    ALT 33 02/14/2018     Lab Results   Component Value Date    CHOL 146 11/11/2019    CHOL 160 01/27/2017     Lab Results   Component Value Date    TRIG 195 (H) 11/11/2019    TRIG 176 01/27/2017     Lab Results   Component Value Date    HDL 44 11/11/2019    HDL 47 (L) 01/27/2017     No components found for: LDLCALC  Lab Results   Component Value Date    LDL 68 11/11/2019    LDL 82 01/27/2017     No results found for: HDLLDLRATIO  No components found for: CHOLHDL  Lab Results   Component Value Date    HGBA1C 7.6 (H) 11/11/2019     No results found for: TSH, H8ZBVKE, G3NTHCJ, THYROIDAB        ASSESSMENT AND PLAN      Andre was seen today for follow-up.    Diagnoses and all orders for this visit:    Ischemic cardiomyopathy  He is doing well today.  He has a normal EF by the last echo which was about 3 years ago.  Is interested in the future coming off his medicines and possibly the losartan or digoxin could actually be one we could.  We will see him back in 6 months and probably get a echo at that time.  If his EF is still within the 50% range we can start taking him off some of his medicines.  Mixed hyperlipidemia  This is been stable and actually was last checked in November 2019.  Essential hypertension  Stable.      Patient's Body mass index is 20.63 kg/m². BMI is within normal parameters. No follow-up required..                Geovanni Ortiz MD  3/2/2020  11:52 AM

## 2020-08-05 ENCOUNTER — CLINICAL SUPPORT (OUTPATIENT)
Dept: CARDIOLOGY | Facility: CLINIC | Age: 65
End: 2020-08-05

## 2020-08-05 DIAGNOSIS — I25.5 ISCHEMIC CARDIOMYOPATHY: Primary | ICD-10-CM

## 2020-08-05 DIAGNOSIS — Z95.810 AICD (AUTOMATIC CARDIOVERTER/DEFIBRILLATOR) PRESENT: Chronic | ICD-10-CM

## 2020-08-05 PROCEDURE — 93289 INTERROG DEVICE EVAL HEART: CPT | Performed by: NURSE PRACTITIONER

## 2020-08-05 NOTE — PROGRESS NOTES
Pacemaker Evaluation Report    August 5, 2020    Primary Cardiologist: Dr. Ortiz  Implanting MD: Dr. Wright  :Medtronic Model: [id number] Serial Number: [id number]  Implant date: 11/14/2013     Reason for evaluation: routine, ICD, Office  Cardiac device indication(s): cardiomyopathy, ischemic    Battery  AMIRA: 2.73 V   Last charge: 11.7 sec on 4/14/2020    Interrogation Results  Atrial sensing: P wave: 3.9 mV  Atrial capture: 0.75 V @ 0.4 ms   Atrial lead impedance: 532 ohms  Ventricular sensing: R wave: 13.9 mV  Ventricular capture: 0.75 V @ 0.4 ms  Ventricular lead impedance: right  437 ohms; HV:  51/90 ohms    Parameters  Mode: AAIR<=>DDDR  Base Rate: 60/140    Diagnostic Data  Atrial paced: 68.9 %   Ventricular paced: 2.5%  Mode switch: 0%  AT/AF [place]: 0%  AHR: 0  VHR: 2 VT-NS (> 4 beats)    Changes made: no changes    Conclusions: normal device function and Follow up in 3 months    Assessment:  1. Ischemic cardiomyopathy    2. AICD (automatic cardioverter/defibrillator) present            This document has been electronically signed by DEVORAH Talavera on August 5, 2020 14:58

## 2021-02-03 ENCOUNTER — CLINICAL SUPPORT (OUTPATIENT)
Dept: CARDIOLOGY | Facility: CLINIC | Age: 66
End: 2021-02-03

## 2021-02-03 DIAGNOSIS — Z95.810 AICD (AUTOMATIC CARDIOVERTER/DEFIBRILLATOR) PRESENT: Chronic | ICD-10-CM

## 2021-02-03 DIAGNOSIS — I25.5 ISCHEMIC CARDIOMYOPATHY: Primary | ICD-10-CM

## 2021-02-03 NOTE — PROGRESS NOTES
Pacemaker Evaluation Report    February 3, 2021    Primary Cardiologist: Dr. Ortiz  Implanting MD: Dr. Wright  :Medtronic Model: [id number] Serial Number: [id number]  Implant date: 11/14/2013     Reason for evaluation: routine, ICD, Office  Cardiac device indication(s): cardiomyopathy, ischemic    Battery  AMIRA: 2.65 V   Last charge: 11.7 sec on 4/14/2020    Interrogation Results  Atrial sensing: P wave: 5.1 mV  Atrial capture: 1.50 V @ 0.4 ms   Atrial lead impedance: 570 ohms  Ventricular sensing: R wave: 14.8 mV  Ventricular capture: 2.00 V @ 0.4 ms  Ventricular lead impedance: right  494 ohms; HV:  57/101 ohms    Parameters  Mode: AAIR<=>DDDR  Base Rate: 60/140    Diagnostic Data  Atrial paced: 65.9 %   Ventricular paced: 4.4%  Mode switch: 0%  AT/AF [place]: 0%  AHR: 0  VHR: 3 VT-NS (> 4 beats)    Intrinsic Rate: 60    Changes made: no changes    Conclusions: normal device function and Follow up in 3 months    Assessment:  1. Ischemic cardiomyopathy    2. AICD (automatic cardioverter/defibrillator) present            This document has been electronically signed by DEVORAH Talavera on February 4, 2021 09:25 CST

## 2021-02-04 PROCEDURE — 93289 INTERROG DEVICE EVAL HEART: CPT | Performed by: NURSE PRACTITIONER

## 2021-04-07 ENCOUNTER — CLINICAL SUPPORT (OUTPATIENT)
Dept: CARDIOLOGY | Facility: CLINIC | Age: 66
End: 2021-04-07

## 2021-04-07 DIAGNOSIS — I25.5 ISCHEMIC CARDIOMYOPATHY: Primary | ICD-10-CM

## 2021-04-07 DIAGNOSIS — Z95.810 AICD (AUTOMATIC CARDIOVERTER/DEFIBRILLATOR) PRESENT: Chronic | ICD-10-CM

## 2021-04-07 PROCEDURE — 93289 INTERROG DEVICE EVAL HEART: CPT | Performed by: NURSE PRACTITIONER

## 2021-04-07 NOTE — PROGRESS NOTES
Pacemaker Evaluation Report    February 3, 2021    Primary Cardiologist: Dr. Ortiz  Implanting MD: Dr. Wright  :Medtronic Model: [id number] Serial Number: [id number]  Implant date: 11/14/2013     Reason for evaluation: routine, ICD, Office  Cardiac device indication(s): cardiomyopathy, ischemic    Battery  AMIRA: 2.63 V   Last charge: 12.5 sec on 10/14/2020    Interrogation Results  Atrial sensing: P wave: 5.1 mV  Atrial capture: 1.50 V @ 0.4 ms   Atrial lead impedance: 513 ohms  Ventricular sensing: R wave: 13.9 mV  Ventricular capture: 2.00 V @ 0.4 ms  Ventricular lead impedance: right  456 ohms; HV:  52/93 ohms    Parameters  Mode: AAIR<=>DDDR  Base Rate: 60/140    Diagnostic Data  Atrial paced: 61.9 %   Ventricular paced: 1.6%  Mode switch: 0%  AT/AF [place]: 0%  AHR: 0  VHR: 3 VT-NS (> 4 beats)    Intrinsic Rate: 60    Changes made: no changes    Conclusions: battery at elective replacement voltage and follow up in 2 months    Assessment:  1. Ischemic cardiomyopathy    2. AICD (automatic cardioverter/defibrillator) present            This document has been electronically signed by DEVORAH Talavera on April 7, 2021 13:25 CDT

## 2021-06-02 ENCOUNTER — CLINICAL SUPPORT (OUTPATIENT)
Dept: CARDIOLOGY | Facility: CLINIC | Age: 66
End: 2021-06-02

## 2021-06-02 DIAGNOSIS — I25.5 ISCHEMIC CARDIOMYOPATHY: Primary | ICD-10-CM

## 2021-06-02 DIAGNOSIS — Z95.810 AICD (AUTOMATIC CARDIOVERTER/DEFIBRILLATOR) PRESENT: ICD-10-CM

## 2021-06-02 PROCEDURE — 93289 INTERROG DEVICE EVAL HEART: CPT | Performed by: NURSE PRACTITIONER

## 2021-06-02 NOTE — PROGRESS NOTES
Pacemaker Evaluation Report    June 2, 2021    Primary Cardiologist: Dr. Ortiz  Implanting MD: Dr. Wright  :Medtronic Model: [id number] Serial Number: [id number]  Implant date: 11/14/2013     Reason for evaluation: routine, ICD, Office  Cardiac device indication(s): cardiomyopathy, ischemic    Battery  AMIRA: 2.63 V   Last charge: 13.7 sec on 4/14/2021    Interrogation Results  Atrial sensing: P wave: 5.1 mV  Atrial capture: 1.50 V @ 0.4 ms   Atrial lead impedance: 513 ohms  Ventricular sensing: R wave: 13.4 mV  Ventricular capture: 2.00 V @ 0.4 ms  Ventricular lead impedance: right  456 ohms; HV:  54/94 ohms    Parameters  Mode: AAIR<=>DDDR  Base Rate: 60/140    Diagnostic Data  Atrial paced: 62.2 %   Ventricular paced: 1.6%  Mode switch: 0%  AT/AF [place]: 0%  AHR: 0  VHR: 1 VT-NS (> 4 beats)    Intrinsic Rate: 60    Changes made: no changes    Close to AMIRA per patient's request follow up in 3 months. Patient knows to call if he hears his device beep switch alerts him that he has reached AMIRA.    Conclusions: battery at elective replacement voltage and follow up in 2 months    Assessment:  1. Ischemic cardiomyopathy    2. AICD (automatic cardioverter/defibrillator) present                    This document has been electronically signed by DEVORAH Ponce on Natalie 3, 2021 10:38 CDT

## 2021-09-01 ENCOUNTER — CLINICAL SUPPORT (OUTPATIENT)
Dept: CARDIOLOGY | Facility: CLINIC | Age: 66
End: 2021-09-01

## 2021-09-01 DIAGNOSIS — I25.5 ISCHEMIC CARDIOMYOPATHY: Primary | ICD-10-CM

## 2021-09-01 DIAGNOSIS — Z95.810 AICD (AUTOMATIC CARDIOVERTER/DEFIBRILLATOR) PRESENT: Chronic | ICD-10-CM

## 2021-09-01 PROCEDURE — 93289 INTERROG DEVICE EVAL HEART: CPT | Performed by: NURSE PRACTITIONER

## 2021-09-01 NOTE — PROGRESS NOTES
Pacemaker Evaluation Report    Sept 1, 2021    Primary Cardiologist: Dr. Ortiz  Implanting MD: Dr. Wright  :Medtronic Model: [id number] Serial Number: [id number]  Implant date: 11/14/2013     Reason for evaluation: routine, ICD, Office  Cardiac device indication(s): cardiomyopathy, ischemic    Battery  AMIRA: 2.60 V   Last charge: 13.7 sec on 4/14/2021    Interrogation Results  Atrial sensing: P wave: 5.0 mV  Atrial capture: 1.50 V @ 0.4 ms   Atrial lead impedance: 513 ohms  Ventricular sensing: R wave: 13.3 mV  Ventricular capture: 2.00 V @ 0.4 ms  Ventricular lead impedance: right  437 ohms; HV:  53/92 ohms    Parameters  Mode: AAIR<=>DDDR  Base Rate: 60/140    Diagnostic Data  Atrial paced: 64.2 %   Ventricular paced: 4.9%  Mode switch: 0%  AT/AF [place]: 0%  AHR: 0  VHR: 1 VT-NS (10 beats) July 21, 2021    Intrinsic Rate: 60    Changes made: no changes    Close to AMIRA per patient's request follow up in 3 months. Patient knows to call if he hears his device beep switch alerts him that he has reached AMIRA.    Conclusions:     Assessment:  1. Ischemic cardiomyopathy    2. AICD (automatic cardioverter/defibrillator) present                This document has been electronically signed by DEVORAH Talavera on September 1, 2021 15:38 CDT

## 2021-09-27 ENCOUNTER — TELEPHONE (OUTPATIENT)
Dept: CARDIOLOGY | Facility: CLINIC | Age: 66
End: 2021-09-27

## 2021-09-27 ENCOUNTER — PREP FOR SURGERY (OUTPATIENT)
Dept: OTHER | Facility: HOSPITAL | Age: 66
End: 2021-09-27

## 2021-09-27 DIAGNOSIS — I25.5 ISCHEMIC CARDIOMYOPATHY: Primary | ICD-10-CM

## 2021-09-27 RX ORDER — BUPIVACAINE HCL/0.9 % NACL/PF 0.1 %
2 PLASTIC BAG, INJECTION (ML) EPIDURAL ONCE
Status: CANCELLED | OUTPATIENT
Start: 2021-10-11 | End: 2021-09-27

## 2021-09-27 RX ORDER — SODIUM CHLORIDE 0.9 % (FLUSH) 0.9 %
3 SYRINGE (ML) INJECTION EVERY 12 HOURS SCHEDULED
Status: CANCELLED | OUTPATIENT
Start: 2021-10-11

## 2021-09-27 RX ORDER — SODIUM CHLORIDE 0.9 % (FLUSH) 0.9 %
10 SYRINGE (ML) INJECTION AS NEEDED
Status: CANCELLED | OUTPATIENT
Start: 2021-10-11

## 2021-09-27 NOTE — TELEPHONE ENCOUNTER
"Patient presented to waiting room with his \"device beeping\". He has medtronic ICD that we knew was approaching AMIRA. He was told 9/1/21 to call when his device beeped.     We will get him set up for generator change with Dr. Wright.   Message sent to Cale for scheduling.   "

## 2021-09-27 NOTE — TELEPHONE ENCOUNTER
Contacted patient to go over generator change procedure. Procedure scheduled on 10/11/21 with Covid swab on 10/8/21 between hours of 9-10am. Went over instructions with patient, including for patient to take all medications with small sip of water per Dr. Wright. Mailed out patient instruction packet. Patient voiced his understanding.

## 2021-10-06 RX ORDER — ROSUVASTATIN CALCIUM 20 MG/1
10 TABLET, COATED ORAL NIGHTLY
COMMUNITY

## 2021-10-06 NOTE — PAT
Patient states he was told by MD not to stop Aspirin or Plavix prior to procedure, and to take all medications on morning of procedure.

## 2021-10-08 ENCOUNTER — LAB (OUTPATIENT)
Dept: LAB | Facility: HOSPITAL | Age: 66
End: 2021-10-08

## 2021-10-08 DIAGNOSIS — Z01.818 PREOP TESTING: Primary | ICD-10-CM

## 2021-10-08 LAB — SARS-COV-2 N GENE RESP QL NAA+PROBE: NOT DETECTED

## 2021-10-08 PROCEDURE — C9803 HOPD COVID-19 SPEC COLLECT: HCPCS

## 2021-10-08 PROCEDURE — 87635 SARS-COV-2 COVID-19 AMP PRB: CPT

## 2021-10-10 ENCOUNTER — ANESTHESIA EVENT (OUTPATIENT)
Dept: CARDIOLOGY | Facility: HOSPITAL | Age: 66
End: 2021-10-10

## 2021-10-11 ENCOUNTER — ANESTHESIA (OUTPATIENT)
Dept: CARDIOLOGY | Facility: HOSPITAL | Age: 66
End: 2021-10-11

## 2021-10-11 ENCOUNTER — HOSPITAL ENCOUNTER (OUTPATIENT)
Facility: HOSPITAL | Age: 66
Setting detail: HOSPITAL OUTPATIENT SURGERY
Discharge: HOME | End: 2021-10-11
Attending: INTERNAL MEDICINE | Admitting: INTERNAL MEDICINE

## 2021-10-11 VITALS
TEMPERATURE: 96.9 F | BODY MASS INDEX: 20.06 KG/M2 | RESPIRATION RATE: 18 BRPM | DIASTOLIC BLOOD PRESSURE: 85 MMHG | WEIGHT: 120.37 LBS | HEIGHT: 65 IN | OXYGEN SATURATION: 95 % | HEART RATE: 65 BPM | SYSTOLIC BLOOD PRESSURE: 174 MMHG

## 2021-10-11 DIAGNOSIS — I25.5 ISCHEMIC CARDIOMYOPATHY: ICD-10-CM

## 2021-10-11 LAB
ANION GAP SERPL CALCULATED.3IONS-SCNC: 12 MMOL/L (ref 5–15)
APTT PPP: 27.6 SECONDS (ref 20–40.3)
BUN SERPL-MCNC: 12 MG/DL (ref 8–23)
BUN/CREAT SERPL: 13 (ref 7–25)
CALCIUM SPEC-SCNC: 9.9 MG/DL (ref 8.6–10.5)
CHLORIDE SERPL-SCNC: 100 MMOL/L (ref 98–107)
CO2 SERPL-SCNC: 26 MMOL/L (ref 22–29)
CREAT SERPL-MCNC: 0.92 MG/DL (ref 0.76–1.27)
DEPRECATED RDW RBC AUTO: 38.7 FL (ref 37–54)
ERYTHROCYTE [DISTWIDTH] IN BLOOD BY AUTOMATED COUNT: 12.5 % (ref 12.3–15.4)
GFR SERPL CREATININE-BSD FRML MDRD: 83 ML/MIN/1.73
GLUCOSE BLDC GLUCOMTR-MCNC: 212 MG/DL (ref 70–130)
GLUCOSE SERPL-MCNC: 219 MG/DL (ref 65–99)
HCT VFR BLD AUTO: 43.1 % (ref 37.5–51)
HGB BLD-MCNC: 15.5 G/DL (ref 13–17.7)
INR PPP: 1.07 (ref 0.8–1.2)
MCH RBC QN AUTO: 30.9 PG (ref 26.6–33)
MCHC RBC AUTO-ENTMCNC: 36 G/DL (ref 31.5–35.7)
MCV RBC AUTO: 86 FL (ref 79–97)
PLATELET # BLD AUTO: 217 10*3/MM3 (ref 140–450)
PMV BLD AUTO: 9.6 FL (ref 6–12)
POTASSIUM SERPL-SCNC: 4.7 MMOL/L (ref 3.5–5.2)
PROTHROMBIN TIME: 13.8 SECONDS (ref 11.1–15.3)
RBC # BLD AUTO: 5.01 10*6/MM3 (ref 4.14–5.8)
SODIUM SERPL-SCNC: 138 MMOL/L (ref 136–145)
WBC # BLD AUTO: 6.8 10*3/MM3 (ref 3.4–10.8)

## 2021-10-11 PROCEDURE — 93641 EP EVL 1/2CHMB PAC CVDFB TST: CPT

## 2021-10-11 PROCEDURE — 25010000002 PROPOFOL 10 MG/ML EMULSION: Performed by: NURSE ANESTHETIST, CERTIFIED REGISTERED

## 2021-10-11 PROCEDURE — 25010000002 GENTAMICIN PER 80 MG: Performed by: NURSE ANESTHETIST, CERTIFIED REGISTERED

## 2021-10-11 PROCEDURE — 93005 ELECTROCARDIOGRAM TRACING: CPT | Performed by: ANESTHESIOLOGY

## 2021-10-11 PROCEDURE — 80048 BASIC METABOLIC PNL TOTAL CA: CPT | Performed by: NURSE PRACTITIONER

## 2021-10-11 PROCEDURE — 25010000002 CEFAZOLIN PER 500 MG: Performed by: NURSE PRACTITIONER

## 2021-10-11 PROCEDURE — 85730 THROMBOPLASTIN TIME PARTIAL: CPT | Performed by: NURSE PRACTITIONER

## 2021-10-11 PROCEDURE — 33263 RMVL & RPLCMT DFB GEN 2 LEAD: CPT | Performed by: INTERNAL MEDICINE

## 2021-10-11 PROCEDURE — S0260 H&P FOR SURGERY: HCPCS | Performed by: INTERNAL MEDICINE

## 2021-10-11 PROCEDURE — C1721 AICD, DUAL CHAMBER: HCPCS | Performed by: INTERNAL MEDICINE

## 2021-10-11 PROCEDURE — 25010000002 FENTANYL CITRATE (PF) 50 MCG/ML SOLUTION: Performed by: NURSE ANESTHETIST, CERTIFIED REGISTERED

## 2021-10-11 PROCEDURE — 82962 GLUCOSE BLOOD TEST: CPT

## 2021-10-11 PROCEDURE — 93010 ELECTROCARDIOGRAM REPORT: CPT | Performed by: INTERNAL MEDICINE

## 2021-10-11 PROCEDURE — 85027 COMPLETE CBC AUTOMATED: CPT | Performed by: NURSE PRACTITIONER

## 2021-10-11 PROCEDURE — 25010000002 MIDAZOLAM PER 1 MG: Performed by: NURSE ANESTHETIST, CERTIFIED REGISTERED

## 2021-10-11 PROCEDURE — 85610 PROTHROMBIN TIME: CPT | Performed by: NURSE PRACTITIONER

## 2021-10-11 DEVICE — IMPLANTABLE DEVICE: Type: IMPLANTABLE DEVICE | Status: FUNCTIONAL

## 2021-10-11 RX ORDER — SODIUM CHLORIDE 0.9 % (FLUSH) 0.9 %
10 SYRINGE (ML) INJECTION AS NEEDED
Status: DISCONTINUED | OUTPATIENT
Start: 2021-10-11 | End: 2021-10-11 | Stop reason: HOSPADM

## 2021-10-11 RX ORDER — SODIUM CHLORIDE 0.9 % (FLUSH) 0.9 %
3 SYRINGE (ML) INJECTION EVERY 12 HOURS SCHEDULED
Status: DISCONTINUED | OUTPATIENT
Start: 2021-10-11 | End: 2021-10-11 | Stop reason: HOSPADM

## 2021-10-11 RX ORDER — PROPOFOL 10 MG/ML
VIAL (ML) INTRAVENOUS AS NEEDED
Status: DISCONTINUED | OUTPATIENT
Start: 2021-10-11 | End: 2021-10-11 | Stop reason: SURG

## 2021-10-11 RX ORDER — MIDAZOLAM HYDROCHLORIDE 1 MG/ML
INJECTION INTRAMUSCULAR; INTRAVENOUS AS NEEDED
Status: DISCONTINUED | OUTPATIENT
Start: 2021-10-11 | End: 2021-10-11 | Stop reason: SURG

## 2021-10-11 RX ORDER — GENTAMICIN 10 MG/ML
INJECTION, SOLUTION INTRAMUSCULAR; INTRAVENOUS AS NEEDED
Status: DISCONTINUED | OUTPATIENT
Start: 2021-10-11 | End: 2021-10-11 | Stop reason: SURG

## 2021-10-11 RX ORDER — LIDOCAINE HYDROCHLORIDE 20 MG/ML
INJECTION, SOLUTION INFILTRATION; PERINEURAL AS NEEDED
Status: DISCONTINUED | OUTPATIENT
Start: 2021-10-11 | End: 2021-10-11 | Stop reason: HOSPADM

## 2021-10-11 RX ORDER — SODIUM CHLORIDE 9 MG/ML
1000 INJECTION, SOLUTION INTRAVENOUS CONTINUOUS
Status: DISCONTINUED | OUTPATIENT
Start: 2021-10-11 | End: 2021-10-11 | Stop reason: HOSPADM

## 2021-10-11 RX ORDER — BUPIVACAINE HCL/0.9 % NACL/PF 0.1 %
2 PLASTIC BAG, INJECTION (ML) EPIDURAL ONCE
Status: COMPLETED | OUTPATIENT
Start: 2021-10-11 | End: 2021-10-11

## 2021-10-11 RX ORDER — FENTANYL CITRATE 50 UG/ML
INJECTION, SOLUTION INTRAMUSCULAR; INTRAVENOUS AS NEEDED
Status: DISCONTINUED | OUTPATIENT
Start: 2021-10-11 | End: 2021-10-11 | Stop reason: SURG

## 2021-10-11 RX ADMIN — MIDAZOLAM HYDROCHLORIDE 1 MG: 2 INJECTION, SOLUTION INTRAMUSCULAR; INTRAVENOUS at 13:45

## 2021-10-11 RX ADMIN — FENTANYL CITRATE 25 MCG: 50 INJECTION INTRAMUSCULAR; INTRAVENOUS at 14:17

## 2021-10-11 RX ADMIN — FENTANYL CITRATE 25 MCG: 50 INJECTION INTRAMUSCULAR; INTRAVENOUS at 13:43

## 2021-10-11 RX ADMIN — GENTAMICIN 80 MG: 10 INJECTION, SOLUTION INTRAMUSCULAR; INTRAVENOUS at 14:04

## 2021-10-11 RX ADMIN — FENTANYL CITRATE 25 MCG: 50 INJECTION INTRAMUSCULAR; INTRAVENOUS at 14:43

## 2021-10-11 RX ADMIN — PROPOFOL 20 MG: 10 INJECTION, EMULSION INTRAVENOUS at 14:10

## 2021-10-11 RX ADMIN — MIDAZOLAM HYDROCHLORIDE 1 MG: 2 INJECTION, SOLUTION INTRAMUSCULAR; INTRAVENOUS at 14:10

## 2021-10-11 RX ADMIN — Medication 2 G: at 13:53

## 2021-10-11 RX ADMIN — MIDAZOLAM HYDROCHLORIDE 1 MG: 2 INJECTION, SOLUTION INTRAMUSCULAR; INTRAVENOUS at 14:55

## 2021-10-11 RX ADMIN — PROPOFOL 30 MG: 10 INJECTION, EMULSION INTRAVENOUS at 14:17

## 2021-10-11 RX ADMIN — SODIUM CHLORIDE 1000 ML: 9 INJECTION, SOLUTION INTRAVENOUS at 08:45

## 2021-10-11 RX ADMIN — FENTANYL CITRATE 25 MCG: 50 INJECTION INTRAMUSCULAR; INTRAVENOUS at 13:50

## 2021-10-11 RX ADMIN — MIDAZOLAM HYDROCHLORIDE 1 MG: 2 INJECTION, SOLUTION INTRAMUSCULAR; INTRAVENOUS at 13:56

## 2021-10-11 NOTE — ANESTHESIA POSTPROCEDURE EVALUATION
Patient: Andre Garcia Concepcion    Procedure Summary     Date: 10/11/21 Room / Location: [facility] CATH/EP LAB  / [facility] CATH INVASIVE LOCATION    Anesthesia Start: 1343 Anesthesia Stop: 1517    Procedure: ICD DC generator change (N/A ) Diagnosis:       Ischemic cardiomyopathy      (cardiomyopathy, generator change)    Providers: Oralia Wright MD Provider: Selena Peñaloza CRNA    Anesthesia Type: MAC ASA Status: 4          Anesthesia Type: MAC    Vitals  No vitals data found for the desired time range.          113/61, hr70, 100% simple mask

## 2021-10-11 NOTE — H&P
Cardiology at [facility] History and Physical Note      Andre Garcia Concepcion  Pool/CATH  [id number]  1955    DATE OF ADMISSION: 10/11/2021    Reason for Hospitalization: ICD reached to AMIRA status    History of Present Illness:  Body mass index is 20.8 kg/m².  BMI within normal range no further recommendation      66 years old patient with history of cardiac disease, cardiomyopathy exertional dyspnea functional class I-II and history of ventriculogram s/p ICD.  Per recent interrogation ICD to AMIRA status.  He presented for generator replacement.  Patient denied chest pain orthopnea PND intermittent claudication syncope.  No symptom of cardiac decompensation reported by the patient he does have a background history of hypertension, hyperlipidemia, diabetes  Allergies   Allergen Reactions   • Niacin And Related Other (See Comments)     Other reaction(s): Other (See Comments)  Flushing       Prior to Admission medications    Medication Sig Start Date End Date Taking? Authorizing Provider   rosuvastatin (CRESTOR) 20 MG tablet Take 10 mg by mouth Every Night.   Yes Nette Jim MD   aspirin 81 MG EC tablet Take 81 mg by mouth Daily.    Nette Jim MD   carvedilol (COREG) 12.5 MG tablet Take 1 tablet by mouth 2 (Two) Times a Day With Meals. 8/20/19   Ian Arora MD   clopidogrel (PLAVIX) 75 MG tablet Take 1 tablet by mouth Daily. 12/18/18   Ian Arora MD   digoxin (LANOXIN) 125 MCG tablet Take 1 tablet by mouth Daily. 8/20/19   Ian Arora MD   glimepiride (AMARYL) 2 MG tablet Take 2 mg by mouth 2 (two) times a day.    Nette Jim MD   losartan (COZAAR) 50 MG tablet Take 1 tablet by mouth Daily. 10/22/18   Ian Arora MD   metFORMIN (GLUCOPHAGE) 850 MG tablet Take 850 mg by mouth 2 (Two) Times a Day With Meals.    Nette Jim MD       Past Medical History:   Diagnosis Date   • Diabetes mellitus (HCC)    • Hyperlipidemia    •  "Hypertension    • Ischemic cardiomyopathy    • Myocardial infarction (HCC)        Past Surgical History:   Procedure Laterality Date   • CARDIAC CATHETERIZATION     • CORONARY ARTERY BYPASS GRAFT     • INSERT / REPLACE / REMOVE PACEMAKER         Social History     Socioeconomic History   • Marital status: Single   Tobacco Use   • Smoking status: Never Smoker   • Smokeless tobacco: Never Used   Vaping Use   • Vaping Use: Never used   Substance and Sexual Activity   • Alcohol use: No   • Drug use: No   • Sexual activity: Defer       History reviewed. No pertinent family history.    Patient Active Problem List   Diagnosis   • Ischemic cardiomyopathy   • Coronary artery disease involving coronary bypass graft of native heart   • Mixed hyperlipidemia   • Essential hypertension   • Diabetes mellitus due to underlying condition with stage 1 chronic kidney disease, without long-term current use of insulin (HCC)   • Acute upper respiratory infection   • AICD (automatic cardioverter/defibrillator) present   • Coronary artery disease involving native coronary artery of native heart without angina pectoris   • Hypercholesterolemia   • Type 2 diabetes mellitus with hyperglycemia, without long-term current use of insulin (HCC)        REVIEW OF SYSTEMS:   Review of Systems    12 point ROS was performed and is negative except as outlined in HPI.       Objective:     Vitals:    10/06/21 1459   Weight: 56.7 kg (125 lb)   Height: 165.1 cm (65\")     Body mass index is 20.8 kg/m².  Flowsheet Rows      First Filed Value   Admission Height 165.1 cm (65\") Documented at 10/06/2021 1459   Admission Weight 56.7 kg (125 lb) Documented at 10/06/2021 1459        No intake or output data in the 24 hours ending 10/11/21 0830      Physical Exam:  Constitutional: Oriented to person, place, and time.  Well-developed and well-nourished. No distress.   HENT: Normocephalic.   Eyes: Conjunctivae are normal. No scleral icterus.   Neck: Normal carotid " pulses, no hepatojugular reflux and no JVD present.   Cardiovascular: Normal rate, regular rhythm, S1 normal, S2 normal, normal heart sounds and intact distal pulses.   No extrasystoles are present.   Pulmonary/Chest: Effort normal and breath sounds normal. No respiratory distress.   Abdominal: Soft. Bowel sounds are normal. Exhibits no distension and no mass.   Musculoskeletal:  Exhibits no edema, no tenderness.   Neurological: Alert and awake.  Skin: Warm and dry.  Psychiatric: Normal mood and affect.      Lab Review:                                            I personally viewed and interpreted the patient's EKG/Telemetry data.      Assessment/Plan:   #1 ICD reached to AMIRA status    66 years old patient with a background history of coronary disease ischemic cardiomyopathy, hypertension, hyperlipidemia lipidemia and diabetes.  Patient with history of ventricular tachycardia s/p ICD.  ICD reached AMIRA status.  He presented for generator replacement.  Procedure risk pros and cons discussed with the patient.  Risk included but not limited to infection, bleeding, stroke, pneumothorax, hematoma.  Understand willing to proceed forward.      #2 CAD asymptomatic at the time of admission follow-up with Dr. Montesinos      #3 hypertension we will resume medication after the procedure.    Thank you for allowing me to participate in the care of Andre Javier. Feel free to contact me directly with any further questions or concerns.    Oralia Wright MD  10/11/21  08:30 CDT.    Part of this note may be an electronic transcription/translation of spoken language to printed text using the Dragon Dictation system.

## 2021-10-11 NOTE — DISCHARGE INSTRUCTIONS
Do not drive and lift left arm above the shoulder for 30 days    Keep it dry for 1 week    Remove Tegaderm and gauze tomorrow but leave Steri-Strips in place    Wound checkup with the pacer clinic on Friday and then in 1 week

## 2021-10-11 NOTE — ANESTHESIA PREPROCEDURE EVALUATION
Anesthesia Evaluation     Patient summary reviewed and Nursing notes reviewed   no history of anesthetic complications:  NPO Solid Status: > 8 hours  NPO Liquid Status: > 8 hours           Airway   Mallampati: II  TM distance: <3 FB  Neck ROM: full  Anterior and Possible difficult intubation  Dental    (+) partials and poor dentition    Pulmonary - normal exam    breath sounds clear to auscultation  (-) asthma, recent URI, sleep apnea, rhonchi, decreased breath sounds, wheezes, not a smoker, pulmonary embolism  Cardiovascular   Exercise tolerance: poor (<4 METS)    ECG reviewed  PT is on anticoagulation therapy  Patient on routine beta blocker and Beta blocker given within 24 hours of surgery  Rhythm: regular  Rate: normal    (+) pacemaker ICD interrogated <1 month ago, hypertension 2 medications or greater, past MI  >12 months, CAD, CABG >6 Months, cardiac stents Drug eluting stent more than 12 months ago dysrhythmias Tachycardia, CHF , hyperlipidemia,   (-) angina, murmur, DVT, carotid artery disease    ROS comment: EKG 10/11/2021:  Atrial-paced rhythm with prolonged AV conduction  Incomplete right bundle branch block  Septal infarct (cited on or before 17-SEP-2012)  T wave abnormality, consider lateral ischemia  Abnormal ECG    Neuro/Psych  (-) seizures, TIA, CVA  GI/Hepatic/Renal/Endo    (+)   renal disease CRI, diabetes mellitus type 2 poorly controlled,   (-)  obesity, GERD    Musculoskeletal (-) negative ROS    Abdominal  - normal exam   Substance History - negative use     OB/GYN negative ob/gyn ROS         Other        ROS/Med Hx Other: 66 years old patient with history of cardiac disease, cardiomyopathy exertional dyspnea functional class I-II and history of ventriculogram s/p ICD.  Per recent interrogation ICD to AMIRA status.  He presented for generator replacement.  Patient with history of ventricular tachycardia s/p ICD.    CABG was 16 years ago today had 3 cardiac stents placed before CABG but no  stents since. Has been on digoxin since CABG    This will be the pts 3rd defibrillator    B/L carotid U/S 5/31/2019:  · Proximal right internal carotid artery plaque without significant stenosis.  · Right internal carotid artery stenosis of 0-49%.  · Proximal left internal carotid artery plaque without significant stenosis.  · Left internal carotid artery stenosis of 0-49%.    No echo on file or in care everywhere    Plavix last today this AM    Hx of DM- last [id number]=8.8  Stage 1 chronic kidney disease    Pt has had great following with Dr. Sosa & Dr. Ortiz since CABG        Phys Exam Other: Upper partial                Anesthesia Plan    ASA 4     MAC   (No echo on file. Treat as low EF pt)  intravenous induction     Anesthetic plan, all risks, benefits, and alternatives have been provided, discussed and informed consent has been obtained with: patient.

## 2021-10-12 ENCOUNTER — TELEPHONE (OUTPATIENT)
Dept: CARDIOLOGY | Facility: CLINIC | Age: 66
End: 2021-10-12

## 2021-10-12 NOTE — TELEPHONE ENCOUNTER
Dr. Wright asked that I call patient. Instructed to remove pressure packing dressing. Leave dressing underneath in place and clean dry. Post ICD instructions discussed with patient. He has appt for wound check upcoming.                   This document has been electronically signed by DEVORAH Ponce on October 12, 2021 13:17 CDT

## 2021-10-15 ENCOUNTER — CLINICAL SUPPORT (OUTPATIENT)
Dept: CARDIOLOGY | Facility: CLINIC | Age: 66
End: 2021-10-15

## 2021-10-15 DIAGNOSIS — I25.5 ISCHEMIC CARDIOMYOPATHY: Primary | ICD-10-CM

## 2021-10-15 DIAGNOSIS — Z95.810 AICD (AUTOMATIC CARDIOVERTER/DEFIBRILLATOR) PRESENT: ICD-10-CM

## 2021-10-15 PROCEDURE — 93283 PRGRMG EVAL IMPLANTABLE DFB: CPT | Performed by: NURSE PRACTITIONER

## 2021-10-15 NOTE — PROGRESS NOTES
Pacemaker Evaluation Report  10/15/2021    Primary Cardiologist: Dr. Ortiz  Implanting MD: Dr. Wright  :[employer] Model: Cobalt DR [id number] Serial Number: [id number]  Implant date: 10/22/21     Reason for evaluation:  ICD, Office  Cardiac device indication(s): cardiomyopathy, ischemic    Battery  AMIRA:  10.0 years     Interrogation Results  Atrial sensing: P wave: 3.25 mV  Atrial capture: 0.750 V @ 0.4 ms   Atrial lead impedance: 494 ohms  Ventricular sensing: R wave: 13.3 mV  Ventricular capture: 3.00 V @ 0.4 ms  Ventricular lead impedance: right  418 ohms; HV:  46/82 ohms    Parameters  Mode: AAIR<=>DDDR  Base Rate: 60/130    Diagnostic Data  Atrial paced: 59.5 %   Ventricular paced: 9.4 %  Mode switch: 0%  AT/AF [place]: 0%  AHR: 0  VHR: 1 shock for VT/VF.   Intrinsic Rate: 60    Changes made: Patient came in due to device tone. This was from unsuccessful carelink transmission. Carelink remote transmission setting turned off.      Conclusions: Normal device function. Come in for routine check as scheduled.    Assessment:  1. Ischemic cardiomyopathy    2. AICD (automatic cardioverter/defibrillator) present              This document has been electronically signed by DEVORAH Ponce on October 15, 2021 12:03 CDT

## 2021-10-22 ENCOUNTER — DOCUMENTATION (OUTPATIENT)
Dept: CARDIOLOGY | Facility: CLINIC | Age: 66
End: 2021-10-22

## 2021-10-22 NOTE — PROGRESS NOTES
2 week wound check for gen change.   Intact skin. No bruising. Lifting restrictions continue. May shower.

## 2021-10-25 LAB
QT INTERVAL: 354 MS
QTC INTERVAL: 376 MS

## 2021-11-16 ENCOUNTER — OFFICE VISIT (OUTPATIENT)
Dept: CARDIOLOGY | Facility: CLINIC | Age: 66
End: 2021-11-16

## 2021-11-16 VITALS
HEIGHT: 65 IN | OXYGEN SATURATION: 97 % | DIASTOLIC BLOOD PRESSURE: 90 MMHG | SYSTOLIC BLOOD PRESSURE: 132 MMHG | WEIGHT: 124 LBS | HEART RATE: 92 BPM | BODY MASS INDEX: 20.66 KG/M2

## 2021-11-16 DIAGNOSIS — E78.00 HYPERCHOLESTEROLEMIA: ICD-10-CM

## 2021-11-16 DIAGNOSIS — I10 ESSENTIAL HYPERTENSION: ICD-10-CM

## 2021-11-16 DIAGNOSIS — E78.2 MIXED HYPERLIPIDEMIA: ICD-10-CM

## 2021-11-16 DIAGNOSIS — E11.65 TYPE 2 DIABETES MELLITUS WITH HYPERGLYCEMIA, WITHOUT LONG-TERM CURRENT USE OF INSULIN (HCC): ICD-10-CM

## 2021-11-16 DIAGNOSIS — I25.5 ISCHEMIC CARDIOMYOPATHY: Primary | ICD-10-CM

## 2021-11-16 DIAGNOSIS — I25.708 CORONARY ARTERY DISEASE INVOLVING CORONARY BYPASS GRAFT OF NATIVE HEART WITH OTHER FORMS OF ANGINA PECTORIS (HCC): ICD-10-CM

## 2021-11-16 PROBLEM — I25.10 CORONARY ARTERY DISEASE INVOLVING NATIVE CORONARY ARTERY OF NATIVE HEART WITHOUT ANGINA PECTORIS: Status: RESOLVED | Noted: 2017-02-03 | Resolved: 2021-11-16

## 2021-11-16 PROCEDURE — 99024 POSTOP FOLLOW-UP VISIT: CPT | Performed by: INTERNAL MEDICINE

## 2021-11-16 NOTE — PROGRESS NOTES
Andre Garcia Concepcion  66 y.o. male    11/16/2021  1. Ischemic cardiomyopathy    2. Coronary artery disease involving coronary bypass graft of native heart with other forms of angina pectoris (HCC)    3. Mixed hyperlipidemia    4. Essential hypertension    5. Type 2 diabetes mellitus with hyperglycemia, without long-term current use of insulin (HCC)    6. Hypercholesterolemia        History of Present Illness:  Body mass index is 20.63 kg/m².  Within normal range no further recommendations    66 years old patient with history of coronary disease ischemic cardiomyopathy s/p CABG he had a documented intraventricular tachycardia s/p ICD.  S/p generator replacement generator replacement and DFT testing was performed.  Site healed and recovered very well..  No orthopnea no PND no chest pain no abdominal claudication reported.  He is on appropriate medical management he is a pleased with clinical outcome.  No symptoms distal cardiac decompensation such orthopnea PND chest pain or intermittent claudication reported.      Date of Procedure: 10/11/21     Referring Physician: Dr. Flor     /ELECTROPHYSIOLOGIST:            PROCEDURE(S) PERFORMED:    1. Removal of implantable cardioverter-defibrillator generator   2. Insertion of a new implantable cardioverter-defibrillator generator        3. Defibrillation threshold testing for ventricular fibrillation     INDICATIONS FOR PROCDEDURE:            ICD reached to AMIRA status    Echo 2017    · The left ventricular cavity is borderline dilated.  · Left ventricular systolic function is normal. Estimated EF = 50%.  · Left ventricular diastolic dysfunction (grade I) consistent with impaired relaxation.  · Mild tricuspid valve regurgitation is present.    Lipid November 2020    Component   Ref Range & Units 11 mo ago 2 yr ago 4 yr ago   Total Cholesterol   <200 mg/dL 142  146  160 R    Triglycerides   30 - 150 mg/dL 151 High   195 High   176 R    HDL Cholesterol   32 - 72  mg/dL 50  44  47 Low  R    LDL Cholesterol    5 - 99 mg/dL 68  68  82 R    Chol/HDL Ratio   4.2 - 5.6 2.8 Low   3.3 Low            SUBJECTIVE:    Allergies   Allergen Reactions   • Niacin And Related Other (See Comments)     Other reaction(s): Other (See Comments)  Flushing         Past Medical History:   Diagnosis Date   • Diabetes mellitus (HCC)    • Hyperlipidemia    • Hypertension    • Ischemic cardiomyopathy    • Myocardial infarction (HCC)          Past Surgical History:   Procedure Laterality Date   • CARDIAC CATHETERIZATION      stents x3   • CARDIAC ELECTROPHYSIOLOGY PROCEDURE N/A 10/11/2021    Procedure: ICD DC generator change;  Surgeon: Oralia Wright MD;  Location: [facility] CATH INVASIVE LOCATION;  Service: Cardiology;  Laterality: N/A;  [employer]   • CORONARY ARTERY BYPASS GRAFT     • INSERT / REPLACE / REMOVE PACEMAKER           History reviewed. No pertinent family history.      Social History     Socioeconomic History   • Marital status: Single   Tobacco Use   • Smoking status: Never Smoker   • Smokeless tobacco: Never Used   Vaping Use   • Vaping Use: Never used   Substance and Sexual Activity   • Alcohol use: No   • Drug use: No   • Sexual activity: Defer         Current Outpatient Medications   Medication Sig Dispense Refill   • aspirin 81 MG EC tablet Take 81 mg by mouth Daily.     • carvedilol (COREG) 12.5 MG tablet Take 1 tablet by mouth 2 (Two) Times a Day With Meals. 180 tablet 3   • clopidogrel (PLAVIX) 75 MG tablet Take 1 tablet by mouth Daily. 90 tablet 3   • digoxin (LANOXIN) 125 MCG tablet Take 1 tablet by mouth Daily. 90 tablet 3   • glimepiride (AMARYL) 2 MG tablet Take 2 mg by mouth 2 (two) times a day.     • losartan (COZAAR) 50 MG tablet Take 1 tablet by mouth Daily. 90 tablet 4   • metFORMIN (GLUCOPHAGE) 850 MG tablet Take 850 mg by mouth 2 (Two) Times a Day With Meals.     • rosuvastatin (CRESTOR) 20 MG tablet Take 10 mg by mouth Every Night.       No current  "facility-administered medications for this visit.           Review of Systems:     Constitutional:  Denies recent weight loss, weight gain,no change in exercise tolerance.     HENT:  Denies any hearing loss, epistaxisEyes: No blurring    Respiratory: No history of COPD    Cardiovascular: See H&P    Gastrointestinal:  Denies change in bowel habits and dyspepsia    Endocrine: Negative for cold intolerance, heat intolerance, polydipsia    Genitourinary: Negative.      Musculoskeletal: History of osteoarthritis    Skin:  Deniesrashes, or skin lesions.     Allergic/Immunologic: Negative.  Negative for environmental allergies    Neurological:  Denies any history of recurrent headaches, strokes,     Hematological: Denies any food allergies, seasonal allergies    Psychiatric/Behavioral: Denies any history of depression        OBJECTIVE:    /90 (BP Location: Left arm, Patient Position: Sitting, Cuff Size: Adult)   Pulse 92   Ht 165.1 cm (65\")   Wt 56.2 kg (124 lb)   SpO2 97%   BMI 20.63 kg/m²     Physical Exam:     Constitutional: Oriented to person, place, and time.  Well-developed and well-nourished. No distress.   HENT: Normocephalic.   Eyes: Conjunctivae are normal. No scleral icterus.   Neck: Normal carotid pulses, no hepatojugular reflux and no JVD present.   Cardiovascular: Normal rate, regular rhythm, S1 normal, S2 normal, normal heart sounds and intact distal pulses.   No extrasystoles are present.   Pulmonary/Chest: Effort normal and breath sounds normal. No respiratory distress.   Abdominal: Soft. Bowel sounds are normal. Exhibits no distension and no mass.   Musculoskeletal:  Exhibits no edema, no tenderness.   Neurological: Alert and awake.  Skin: Warm and dry.  Psychiatric: Normal mood and affect.        Procedures      Lab Results   Component Value Date    WBC 6.80 10/11/2021    HGB 15.5 10/11/2021    HCT 43.1 10/11/2021    MCV 86.0 10/11/2021     10/11/2021     Lab Results   Component Value " Date    GLUCOSE 219 (H) 10/11/2021    BUN 12 10/11/2021    CREATININE 0.92 10/11/2021    EGFRIFNONA 83 10/11/2021    BCR 13.0 10/11/2021    CO2 26.0 10/11/2021    CALCIUM 9.9 10/11/2021    ALBUMIN 4.5 02/14/2018    AST 19 02/14/2018    ALT 33 02/14/2018     Lab Results   Component Value Date    CHOL 142 11/30/2020    CHOL 146 11/11/2019    CHOL 160 01/27/2017     Lab Results   Component Value Date    TRIG 151 (H) 11/30/2020    TRIG 195 (H) 11/11/2019    TRIG 176 01/27/2017     Lab Results   Component Value Date    HDL 50 11/30/2020    HDL 44 11/11/2019    HDL 47 (L) 01/27/2017     No components found for: LDLCALC  Lab Results   Component Value Date    LDL 68 11/30/2020    LDL 68 11/11/2019    LDL 82 01/27/2017     No results found for: HDLLDLRATIO  No components found for: CHOLHDL  Lab Results   Component Value Date    HGBA1C 7.8 (H) 11/30/2020     No results found for: TSH, F1EIJYO, Z2AWJMS, THYROIDAB        ASSESSMENT AND PLAN:    #1 ICD reached to AMIRA status s/p generator placement presented post hospital follow-up.  Site healed and recovered very well.  He will continue follow-up with the pacer clinic as per schedule appointment        #2 CAD post CABG asymptomatic at the time of evaluation.  Currently is being treated with Crestor, Plavix and aspirin        #3 hypertension good blood pressure continue losartan and continue carvedilol    #4 hyperlipidemia continue statin with good LDL      Diagnoses and all orders for this visit:    1. Ischemic cardiomyopathy (Primary)    2. Coronary artery disease involving coronary bypass graft of native heart with other forms of angina pectoris (HCC)    3. Mixed hyperlipidemia    4. Essential hypertension    5. Type 2 diabetes mellitus with hyperglycemia, without long-term current use of insulin (HCC)    6. Hypercholesterolemia          Oralia Wright MD  11/16/2021  11:38 CST

## 2021-12-01 ENCOUNTER — CLINICAL SUPPORT (OUTPATIENT)
Dept: CARDIOLOGY | Facility: CLINIC | Age: 66
End: 2021-12-01

## 2021-12-01 DIAGNOSIS — Z95.810 AICD (AUTOMATIC CARDIOVERTER/DEFIBRILLATOR) PRESENT: ICD-10-CM

## 2021-12-01 DIAGNOSIS — I25.5 ISCHEMIC CARDIOMYOPATHY: Primary | ICD-10-CM

## 2021-12-01 PROCEDURE — 93289 INTERROG DEVICE EVAL HEART: CPT | Performed by: NURSE PRACTITIONER

## 2021-12-01 NOTE — PROGRESS NOTES
Pacemaker Evaluation Report  12/1/2021    Primary Cardiologist: Dr. Ortiz  Implanting MD: Dr. Wright  :[employer] Model: Cobalt DR [id number] Serial Number: [id number]  Implant date: 10/11/21     Reason for evaluation: routine, ICD, Office  Cardiac device indication(s): cardiomyopathy, ischemic    Battery  AMIRA:  11.5 years     Interrogation Results  Atrial sensing: P wave: 4.9 mV  Atrial capture: 0.625 V @ 0.4 ms   Atrial lead impedance: 475 ohms  Ventricular sensing: R wave: 17.9 mV  Ventricular capture: 0.750 V @ 0.4 ms  Ventricular lead impedance: right  399 ohms; HV:  46/83 ohms    Parameters  Mode: AAIR<=>DDDR  Base Rate: 60/130    Diagnostic Data  Atrial paced: 64.9 %   Ventricular paced: 8.5 %  Mode switch: 0%  AT/AF [place]: 0%  AHR: 0  VHR: 0  Intrinsic Rate:    Changes made: None    Conclusions: Normal device function. Follow up in 6 months.     Assessment:  1. Ischemic cardiomyopathy    2. AICD (automatic cardioverter/defibrillator) present                This document has been electronically signed by DEVORAH Ponce on December 2, 2021 16:31 CST

## 2022-04-15 ENCOUNTER — OFFICE VISIT (OUTPATIENT)
Dept: CARDIOLOGY | Facility: CLINIC | Age: 67
End: 2022-04-15

## 2022-04-15 VITALS
HEART RATE: 88 BPM | BODY MASS INDEX: 20.49 KG/M2 | HEIGHT: 65 IN | TEMPERATURE: 98.2 F | OXYGEN SATURATION: 99 % | DIASTOLIC BLOOD PRESSURE: 84 MMHG | SYSTOLIC BLOOD PRESSURE: 138 MMHG | WEIGHT: 123 LBS

## 2022-04-15 DIAGNOSIS — I10 ESSENTIAL HYPERTENSION: ICD-10-CM

## 2022-04-15 DIAGNOSIS — N18.1 DIABETES MELLITUS DUE TO UNDERLYING CONDITION WITH STAGE 1 CHRONIC KIDNEY DISEASE, WITHOUT LONG-TERM CURRENT USE OF INSULIN: ICD-10-CM

## 2022-04-15 DIAGNOSIS — R00.2 PALPITATION: ICD-10-CM

## 2022-04-15 DIAGNOSIS — E08.22 DIABETES MELLITUS DUE TO UNDERLYING CONDITION WITH STAGE 1 CHRONIC KIDNEY DISEASE, WITHOUT LONG-TERM CURRENT USE OF INSULIN: ICD-10-CM

## 2022-04-15 DIAGNOSIS — I25.5 ISCHEMIC CARDIOMYOPATHY: Primary | ICD-10-CM

## 2022-04-15 DIAGNOSIS — E78.2 MIXED HYPERLIPIDEMIA: ICD-10-CM

## 2022-04-15 DIAGNOSIS — I25.708 CORONARY ARTERY DISEASE INVOLVING CORONARY BYPASS GRAFT OF NATIVE HEART WITH OTHER FORMS OF ANGINA PECTORIS: ICD-10-CM

## 2022-04-15 PROCEDURE — 99214 OFFICE O/P EST MOD 30 MIN: CPT | Performed by: INTERNAL MEDICINE

## 2022-04-15 NOTE — PROGRESS NOTES
Andre Garcia Concepcion  67 y.o. male    4/15/2022     1. Ischemic cardiomyopathy    2. Coronary artery disease involving coronary bypass graft of native heart with other forms of angina pectoris (HCC)    3. Mixed hyperlipidemia    4. Essential hypertension    5. Diabetes mellitus due to underlying condition with stage 1 chronic kidney disease, without long-term current use of insulin (HCC)        History of Present Illness:  Body mass index is 20.47 kg/m².  Within normal range no further recommendations    67 years old patient presented for routine follow-up follow-up today no symptoms of cardiac decompensation such as metoprolol PND and main complaints is of palpitation tachycardia has to cough up to stop the rhythm.  Patient-last echo in 2017 reported ejection fraction 50% and underwent generator replacement in October 2021.  With history of coronary disease ischemic cardiomyopathy s/p CABG he had a documented intraventricular tachycardia s/p ICD.  S/p generator replacement generator replacement and DFT testing was performed.  Site healed and recovered very well..  No orthopnea no PND no chest pain  claudication reported.  He is on appropriate medical management he is a pleased with clinical outcome.  No symptoms distal cardiac decompensation such orthopnea PND chest pain or intermittent claudication reported.      Date of Procedure: 10/11/21     Referring Physician: Dr. Flor     /ELECTROPHYSIOLOGIST:            PROCEDURE(S) PERFORMED:    1. Removal of implantable cardioverter-defibrillator generator   2. Insertion of a new implantable cardioverter-defibrillator generator        3. Defibrillation threshold testing for ventricular fibrillation     INDICATIONS FOR PROCDEDURE:            ICD reached to AMIRA status    Echo 2017    · The left ventricular cavity is borderline dilated.  · Left ventricular systolic function is normal. Estimated EF = 50%.  · Left ventricular diastolic dysfunction (grade I)  consistent with impaired relaxation.  · Mild tricuspid valve regurgitation is present.    Lipid November 2020    Component   Ref Range & Units 11 mo ago 2 yr ago 4 yr ago   Total Cholesterol   <200 mg/dL 142  146  160 R    Triglycerides   30 - 150 mg/dL 151 High   195 High   176 R    HDL Cholesterol   32 - 72 mg/dL 50  44  47 Low  R    LDL Cholesterol    5 - 99 mg/dL 68  68  82 R    Chol/HDL Ratio   4.2 - 5.6 2.8 Low   3.3 Low            SUBJECTIVE:    Allergies   Allergen Reactions   • Niacin And Related Other (See Comments)     Other reaction(s): Other (See Comments)  Flushing         Past Medical History:   Diagnosis Date   • Diabetes mellitus (HCC)    • Hyperlipidemia    • Hypertension    • Ischemic cardiomyopathy    • Myocardial infarction (HCC)          Past Surgical History:   Procedure Laterality Date   • CARDIAC CATHETERIZATION      stents x3   • CARDIAC ELECTROPHYSIOLOGY PROCEDURE N/A 10/11/2021    Procedure: ICD DC generator change;  Surgeon: Oralia Wright MD;  Location: [facility] CATH INVASIVE LOCATION;  Service: Cardiology;  Laterality: N/A;  [employer]   • CORONARY ARTERY BYPASS GRAFT     • INSERT / REPLACE / REMOVE PACEMAKER           History reviewed. No pertinent family history.      Social History     Socioeconomic History   • Marital status: Single   Tobacco Use   • Smoking status: Never Smoker   • Smokeless tobacco: Never Used   Vaping Use   • Vaping Use: Never used   Substance and Sexual Activity   • Alcohol use: No   • Drug use: No   • Sexual activity: Defer         Current Outpatient Medications   Medication Sig Dispense Refill   • aspirin 81 MG EC tablet Take 81 mg by mouth Daily.     • carvedilol (COREG) 12.5 MG tablet Take 1 tablet by mouth 2 (Two) Times a Day With Meals. 180 tablet 3   • clopidogrel (PLAVIX) 75 MG tablet Take 1 tablet by mouth Daily. 90 tablet 3   • digoxin (LANOXIN) 125 MCG tablet Take 1 tablet by mouth Daily. 90 tablet 3   • glimepiride (AMARYL) 2 MG tablet Take 2 mg by  "mouth 2 (two) times a day.     • losartan (COZAAR) 50 MG tablet Take 1 tablet by mouth Daily. 90 tablet 4   • metFORMIN (GLUCOPHAGE) 850 MG tablet Take 850 mg by mouth 2 (Two) Times a Day With Meals.     • rosuvastatin (CRESTOR) 20 MG tablet Take 10 mg by mouth Every Night.       No current facility-administered medications for this visit.           Review of Systems:     Constitutional:  Denies recent weight loss, weight gain,no change in exercise tolerance.     HENT:  Denies any hearing loss, epistaxisEyes: No blurring    Respiratory: No history of COPD    Cardiovascular: See H&P    Gastrointestinal:  Denies change in bowel habits and dyspepsia    Endocrine: Negative for cold intolerance, heat intolerance, polydipsia    Genitourinary: Negative.      Musculoskeletal: History of osteoarthritis    Skin:  Deniesrashes, or skin lesions.     Allergic/Immunologic: Negative.  Negative for environmental allergies    Neurological:  Denies any history of recurrent headaches, strokes,     Hematological: Denies any food allergies, seasonal allergies    Psychiatric/Behavioral: Denies any history of depression        OBJECTIVE:    /84 (BP Location: Left arm, Patient Position: Sitting, Cuff Size: Adult)   Pulse 88   Temp 98.2 °F (36.8 °C)   Ht 165.1 cm (65\")   Wt 55.8 kg (123 lb)   SpO2 99%   BMI 20.47 kg/m²     Physical Exam:     Constitutional: Oriented to person, place, and time.  Well-developed and well-nourished. No distress.   HENT: Normocephalic.   Eyes: Conjunctivae are normal. No scleral icterus.   Neck: Normal carotid pulses, no hepatojugular reflux and no JVD present.   Cardiovascular: Normal rate, regular rhythm, S1 normal, S2 normal, normal heart sounds and intact distal pulses.   No extrasystoles are present.   Pulmonary/Chest: Effort normal and breath sounds normal. No respiratory distress.   Abdominal: Soft. Bowel sounds are normal. Exhibits no distension and no mass.   Musculoskeletal:  Exhibits no " edema, no tenderness.   Neurological: Alert and awake.  Skin: Warm and dry.  Psychiatric: Normal mood and affect.        Procedures      Lab Results   Component Value Date    WBC 6.80 10/11/2021    HGB 15.5 10/11/2021    HCT 43.1 10/11/2021    MCV 86.0 10/11/2021     10/11/2021     Lab Results   Component Value Date    GLUCOSE 219 (H) 10/11/2021    BUN 12 12/14/2021    CREATININE 0.8 12/14/2021    EGFRIFNONA 83 10/11/2021    BCR 13.0 10/11/2021    CO2 26 12/14/2021    CALCIUM 9.6 12/14/2021    ALBUMIN 4.6 12/14/2021    AST 19 12/14/2021    ALT 18 12/14/2021     Lab Results   Component Value Date    CHOL 142 11/30/2020    CHOL 146 11/11/2019    CHOL 160 01/27/2017     Lab Results   Component Value Date    TRIG 227 (H) 12/14/2021    TRIG 151 (H) 11/30/2020    TRIG 195 (H) 11/11/2019     Lab Results   Component Value Date    HDL 47 12/14/2021    HDL 50 11/30/2020    HDL 44 11/11/2019     No components found for: LDLCALC  Lab Results   Component Value Date    LDL 62 12/14/2021    LDL 68 11/30/2020    LDL 68 11/11/2019     No results found for: HDLLDLRATIO  No components found for: CHOLHDL  Lab Results   Component Value Date    HGBA1C 8.4 (H) 12/14/2021     Lab Results   Component Value Date    TSH 3.53 12/14/2021           ASSESSMENT AND PLAN:    #1 ICD    Continue follow-up with ICD clinic as per schedule appointment        #2 CAD post CABG asymptomatic at the time of evaluation.  Currently is being treated with Crestor, Plavix and aspirin        #3 hypertension good blood pressure continue losartan and continue carvedilol    #4 hyperlipidemia continue statin with good LDL    #5 palpitations/tachycardia  Today complaining of tachycardia and palpitation has to cough to stop arrhythmia mechanism and undefined.  Previous ICD was interrogated no arrhythmia was noted.  We will arrange event monitor for 1 week as he claims 1-2 episodes per week.    Arrange an echocardiogram given the last echo in 2017 with history of  coronary disease ischemic cardiomyopathy    I spent 26 minutes caring for Andre on this date of service. This time includes time spent by me of counseling/coordination of care as relates to the presenting problem and any ordered procedures/tests as outlined above.           This document has been electronically signed by Oralia Wright MD on April 15, 2022 08:26 CDT      Diagnoses and all orders for this visit:    1. Ischemic cardiomyopathy (Primary)    2. Coronary artery disease involving coronary bypass graft of native heart with other forms of angina pectoris (HCC)    3. Mixed hyperlipidemia    4. Essential hypertension    5. Diabetes mellitus due to underlying condition with stage 1 chronic kidney disease, without long-term current use of insulin (HCC)          Oralia Wright MD  4/15/2022  08:18 CDT

## 2022-04-26 ENCOUNTER — TELEPHONE (OUTPATIENT)
Dept: CARDIOLOGY | Facility: CLINIC | Age: 67
End: 2022-04-26

## 2022-04-26 RX ORDER — METOPROLOL SUCCINATE 50 MG/1
50 TABLET, EXTENDED RELEASE ORAL DAILY
Qty: 90 TABLET | Refills: 3 | Status: SHIPPED | OUTPATIENT
Start: 2022-04-26

## 2022-04-26 NOTE — TELEPHONE ENCOUNTER
Contacted patient to inform him that per Dr. Wright his echo showed ef of 50-55 % which looked good. And his MCOT showed sinus tachycardia which is not significant. Will send in Toprol XL 50 mg  One daily and stop coreg. Per Dr. Wright pt can start Actos as recommenced by Dr. Nieves. He voiced his understanding.

## 2022-06-01 ENCOUNTER — CLINICAL SUPPORT (OUTPATIENT)
Dept: CARDIOLOGY | Facility: CLINIC | Age: 67
End: 2022-06-01

## 2022-06-01 DIAGNOSIS — I25.5 ISCHEMIC CARDIOMYOPATHY: Primary | ICD-10-CM

## 2022-06-01 PROCEDURE — 93289 INTERROG DEVICE EVAL HEART: CPT | Performed by: INTERNAL MEDICINE

## 2022-06-01 NOTE — PROGRESS NOTES
Pacemaker Evaluation Report  6/1/22    Primary Cardiologist: Dr. Wright  Implanting MD: Dr. Wright  :[employer] Model: Cobalt  [id number] Serial Number: [id number]  Implant date: 10/11/21     Reason for evaluation: routine, ICD, Office  Cardiac device indication(s): cardiomyopathy, ischemic    Battery  AMIRA:  10.8 years     Interrogation Results  Atrial sensing: P wave: 4.5 mV  Atrial capture: 0.625 V @ 0.4 ms   Atrial lead impedance: 437 ohms  Ventricular sensing: R wave: 13.5 mV  Ventricular capture: 0.750 V @ 0.4 ms  Ventricular lead impedance: right  380 ohms; HV:  45/71 ohms    Parameters  Mode: AAIR<=>DDDR  Base Rate: 60/130    Diagnostic Data  Atrial paced: 69.3 %   Ventricular paced: 9.8 %  Mode switch: 0%  AT/AF [place]: 0%  AHR: 0  VHR: 0      Changes made: None    Conclusions: Normal device function. Follow up in 6 months.     Assessment:  Ischemic cardiomyopathy    Battery voltage voltage greater than 10-year    Follow-up with ICD clinic as per schedule appointment                This document has been electronically signed by Oralia Wright MD on June 6, 2022 12:15 CDT

## 2022-10-18 ENCOUNTER — OFFICE VISIT (OUTPATIENT)
Dept: CARDIOLOGY | Facility: CLINIC | Age: 67
End: 2022-10-18

## 2022-10-18 VITALS
HEART RATE: 88 BPM | SYSTOLIC BLOOD PRESSURE: 118 MMHG | WEIGHT: 131 LBS | HEIGHT: 65 IN | DIASTOLIC BLOOD PRESSURE: 72 MMHG | BODY MASS INDEX: 21.83 KG/M2 | OXYGEN SATURATION: 99 %

## 2022-10-18 DIAGNOSIS — I25.708 CORONARY ARTERY DISEASE INVOLVING CORONARY BYPASS GRAFT OF NATIVE HEART WITH OTHER FORMS OF ANGINA PECTORIS: ICD-10-CM

## 2022-10-18 DIAGNOSIS — I25.5 ISCHEMIC CARDIOMYOPATHY: Primary | ICD-10-CM

## 2022-10-18 DIAGNOSIS — E78.2 MIXED HYPERLIPIDEMIA: ICD-10-CM

## 2022-10-18 DIAGNOSIS — I10 ESSENTIAL HYPERTENSION: ICD-10-CM

## 2022-10-18 DIAGNOSIS — R00.2 PALPITATION: ICD-10-CM

## 2022-10-18 PROBLEM — E78.00 HYPERCHOLESTEROLEMIA: Status: RESOLVED | Noted: 2017-02-03 | Resolved: 2022-10-18

## 2022-10-18 PROCEDURE — 99214 OFFICE O/P EST MOD 30 MIN: CPT | Performed by: INTERNAL MEDICINE

## 2022-10-18 NOTE — PROGRESS NOTES
Andre Garcia Concepcion  67 y.o. male    10/18/2022     1. Ischemic cardiomyopathy    2. Coronary artery disease involving coronary bypass graft of native heart with other forms of angina pectoris (HCC)    3. Mixed hyperlipidemia    4. Essential hypertension    5. Palpitation        History of Present Illness:  Body mass index is 21.8 kg/m².  Within normal range no further recommendations    67 years old patient presented today dated 10/18/2022 for routine follow-up with concurrent medical problem history of palpitation previously evaluated monitor revealed intermittent sinus tachycardia no significant bradyarrhythmia or pause noted normal burden of premature atrial ventricular CAD s/p CABG, ventricular tachycardia s/p ICD, hypertension hyperlipidemia.  Patient is pleased with clinical outcome.  No symptom of cardiac decompensation such orthopnea PND chest pain lightheaded dizziness reported.  Presented for routine follow-up    Monitor 4/26/2022    Clinical impression     Sinus rhythm with intermittent sinus tachycardia with intermittent paced rhythm without significant bradyarrhythmia or pause     #2 normal burden of premature atrial and ventricular complex.      Echo April 2022      • Estimated left ventricular EF = 51% Left ventricular ejection fraction appears to be 51 - 55%. Left ventricular systolic function is normal.  • Left ventricular diastolic function is consistent with (grade I) impaired relaxation.  • Estimated right ventricular systolic pressure from tricuspid regurgitation is normal (<35 mmHg).    Date of Procedure: 10/11/21     Referring Physician: Dr. Flor     /ELECTROPHYSIOLOGIST:            PROCEDURE(S) PERFORMED:    1. Removal of implantable cardioverter-defibrillator generator   2. Insertion of a new implantable cardioverter-defibrillator generator        3. Defibrillation threshold testing for ventricular fibrillation     INDICATIONS FOR PROCDEDURE:            ICD reached to AMIRA  status    Echo 2017    · The left ventricular cavity is borderline dilated.  · Left ventricular systolic function is normal. Estimated EF = 50%.  · Left ventricular diastolic dysfunction (grade I) consistent with impaired relaxation.  · Mild tricuspid valve regurgitation is present.    Lipid November 2020    Component   Ref Range & Units 11 mo ago 2 yr ago 4 yr ago   Total Cholesterol   <200 mg/dL 142  146  160 R    Triglycerides   30 - 150 mg/dL 151 High   195 High   176 R    HDL Cholesterol   32 - 72 mg/dL 50  44  47 Low  R    LDL Cholesterol    5 - 99 mg/dL 68  68  82 R    Chol/HDL Ratio   4.2 - 5.6 2.8 Low   3.3 Low            SUBJECTIVE:    Allergies   Allergen Reactions   • Niacin And Related Other (See Comments)     Other reaction(s): Other (See Comments)  Flushing         Past Medical History:   Diagnosis Date   • Diabetes mellitus (HCC)    • Hyperlipidemia    • Hypertension    • Ischemic cardiomyopathy    • Myocardial infarction (HCC)          Past Surgical History:   Procedure Laterality Date   • CARDIAC CATHETERIZATION      stents x3   • CARDIAC ELECTROPHYSIOLOGY PROCEDURE N/A 10/11/2021    Procedure: ICD DC generator change;  Surgeon: Oralia Wright MD;  Location: [facility] CATH INVASIVE LOCATION;  Service: Cardiology;  Laterality: N/A;  [employer]   • CORONARY ARTERY BYPASS GRAFT     • INSERT / REPLACE / REMOVE PACEMAKER           History reviewed. No pertinent family history.      Social History     Socioeconomic History   • Marital status: Single   Tobacco Use   • Smoking status: Never   • Smokeless tobacco: Never   Vaping Use   • Vaping Use: Never used   Substance and Sexual Activity   • Alcohol use: No   • Drug use: No   • Sexual activity: Defer         Current Outpatient Medications   Medication Sig Dispense Refill   • aspirin 81 MG EC tablet Take 81 mg by mouth Daily.     • clopidogrel (PLAVIX) 75 MG tablet Take 1 tablet by mouth Daily. 90 tablet 3   • digoxin (LANOXIN) 125 MCG tablet Take 1 tablet  "by mouth Daily. 90 tablet 3   • glimepiride (AMARYL) 2 MG tablet Take 2 mg by mouth 2 (two) times a day.     • losartan (COZAAR) 50 MG tablet Take 1 tablet by mouth Daily. 90 tablet 4   • metFORMIN (GLUCOPHAGE) 850 MG tablet Take 850 mg by mouth 2 (Two) Times a Day With Meals.     • metoprolol succinate XL (TOPROL-XL) 50 MG 24 hr tablet Take 1 tablet by mouth Daily. 90 tablet 3   • rosuvastatin (CRESTOR) 20 MG tablet Take 10 mg by mouth Every Night.       No current facility-administered medications for this visit.           Review of Systems:   Today dated 10/18/2022 no significant change was noted in review of system  Constitutional:  Denies recent weight loss, weight gain,no change in exercise tolerance.     HENT:  Denies any hearing loss, epistaxisEyes: No blurring    Respiratory: No history of COPD    Cardiovascular: See H&P    Gastrointestinal:  Denies change in bowel habits and dyspepsia    Endocrine: Negative for cold intolerance, heat intolerance, polydipsia    Genitourinary: Negative.      Musculoskeletal: History of osteoarthritis    Skin:  Deniesrashes, or skin lesions.     Allergic/Immunologic: Negative.  Negative for environmental allergies    Neurological:  Denies any history of recurrent headaches, strokes,     Hematological: Denies any food allergies, seasonal allergies    Psychiatric/Behavioral: Denies any history of depression        OBJECTIVE:    /72 (BP Location: Left arm, Patient Position: Sitting, Cuff Size: Adult)   Pulse 88   Ht 165.1 cm (65\")   Wt 59.4 kg (131 lb)   SpO2 99%   BMI 21.80 kg/m²     Physical Exam:   Today dated 10/18/2022 no significant changes were noted in physical exam  Constitutional: Oriented to person, place, and time.  Well-developed and well-nourished. No distress.   HENT: Normocephalic.   Eyes: Conjunctivae are normal. No scleral icterus.   Neck: Normal carotid pulses, no hepatojugular reflux and no JVD present.   Cardiovascular: Normal rate, regular " rhythm, S1 normal, S2 normal, normal heart sounds and intact distal pulses.   No extrasystoles are present.   Pulmonary/Chest: Effort normal and breath sounds normal. No respiratory distress.   Abdominal: Soft. Bowel sounds are normal. Exhibits no distension and no mass.   Musculoskeletal:  Exhibits no edema, no tenderness.   Neurological: Alert and awake.  Skin: Warm and dry.  Psychiatric: Normal mood and affect.        Procedures      Lab Results   Component Value Date    WBC 6.80 10/11/2021    HGB 15.5 10/11/2021    HCT 43.1 10/11/2021    MCV 86.0 10/11/2021     10/11/2021     Lab Results   Component Value Date    GLUCOSE 219 (H) 10/11/2021    BUN 12 09/30/2022    CREATININE 1.1 09/30/2022    EGFRIFNONA 83 10/11/2021    BCR 13.0 10/11/2021    CO2 27 09/30/2022    CALCIUM 10.1 09/30/2022    ALBUMIN 4.4 09/30/2022    AST 18 09/30/2022    ALT 12 09/30/2022     Lab Results   Component Value Date    CHOL 142 11/30/2020    CHOL 146 11/11/2019    CHOL 160 01/27/2017     Lab Results   Component Value Date    TRIG 125 09/30/2022    TRIG 227 (H) 12/14/2021    TRIG 151 (H) 11/30/2020     Lab Results   Component Value Date    HDL 55 09/30/2022    HDL 47 12/14/2021    HDL 50 11/30/2020     No components found for: LDLCALC  Lab Results   Component Value Date    LDL 75 09/30/2022    LDL 62 12/14/2021    LDL 68 11/30/2020     No results found for: HDLLDLRATIO  No components found for: CHOLHDL  Lab Results   Component Value Date    HGBA1C 6.9 (H) 09/30/2022     Lab Results   Component Value Date    TSH 3.60 09/30/2022           ASSESSMENT AND PLAN:    #1  Ischemic cardiomyopathy and ventricular tachycardia s/p ICD    Continue follow-up with ICD clinic as per schedule appointment        #2 CAD post CABG asymptomatic at the time of evaluation.  Currently is being treated with Crestor, Plavix and aspirin        #3 hypertension good blood pressure continue losartan and continue carvedilol    #4 hyperlipidemia continue statin  with good LDL    Palpitations monitor revealed intermittent sinus tachycardia with a normal burden of premature atrial ventricular complex.    Complaint palpitation predominantly at night currently he is on Toprol-XL 50 mg a day I had the patient take half tablet at night and continue 50 mg in the morning.    I spent 17 minutes caring for Andre on this date of service. This time includes time spent by me of counseling/coordination of care as relates to the presenting problem and any ordered procedures/tests as outlined above.           This document has been electronically signed by Oralia Wright MD on October 18, 2022 09:53 CDT      Diagnoses and all orders for this visit:    1. Ischemic cardiomyopathy (Primary)    2. Coronary artery disease involving coronary bypass graft of native heart with other forms of angina pectoris (HCC)    3. Mixed hyperlipidemia    4. Essential hypertension    5. Palpitation          Oralia Wright MD  10/18/2022  09:53 CDT

## 2022-12-07 ENCOUNTER — CLINICAL SUPPORT (OUTPATIENT)
Dept: CARDIOLOGY | Facility: CLINIC | Age: 67
End: 2022-12-07

## 2022-12-07 DIAGNOSIS — I25.5 ISCHEMIC CARDIOMYOPATHY: Primary | ICD-10-CM

## 2022-12-07 PROCEDURE — 93289 INTERROG DEVICE EVAL HEART: CPT | Performed by: INTERNAL MEDICINE

## 2022-12-07 NOTE — PROGRESS NOTES
Pacemaker Evaluation Report  December 7, 2022    Primary Cardiologist: Dr. Wright  Implanting MD: Dr. Wright  :[employer] Model: Cobalt  [id number] Serial Number: [id number]  Implant date: 10/11/21     Reason for evaluation: routine, ICD, Office  Cardiac device indication(s): cardiomyopathy, ischemic    Battery  AMIRA:  10.3 years     Interrogation Results  Atrial sensing: P wave: 4.9 mV  Atrial capture: 0.625 V @ 0.4 ms   Atrial lead impedance: 475 ohms  Ventricular sensing: R wave: 16.5 mV  Ventricular capture: 0.750 V @ 0.4 ms  Ventricular lead impedance: right  399 ohms; HV:  45/83 ohms    Parameters  Mode: AAIR<=>DDDR  Base Rate: 60/130    Diagnostic Data  Atrial paced: 70 %   Ventricular paced: 21.6 %  Mode switch: 0%  AT/AF [place]: <0.1%  AHR: 0  VHR: 2    Intrinsic: 75      Changes made: None    Conclusions: Normal device function. Follow up in 3 months.     Assessment:  Ischemic cardiomyopathy    Battery voltage voltage greater than 10-year    Follow-up with ICD clinic as per schedule appointment                This document has been electronically signed by Megan Bray RN on December 7, 2022 09:00 CST

## 2023-03-01 ENCOUNTER — CLINICAL SUPPORT (OUTPATIENT)
Dept: CARDIOLOGY | Facility: CLINIC | Age: 68
End: 2023-03-01
Payer: MEDICARE

## 2023-03-01 DIAGNOSIS — I25.5 ISCHEMIC CARDIOMYOPATHY: Primary | ICD-10-CM

## 2023-03-01 PROCEDURE — 93289 INTERROG DEVICE EVAL HEART: CPT | Performed by: INTERNAL MEDICINE

## 2023-03-01 NOTE — PROGRESS NOTES
Pacemaker Evaluation Report  March 1, 2023    Primary Cardiologist: Dr. Wright  Implanting MD: Dr. Wright  :[employer] Model: Cobalt  [id number] Serial Number: [id number]  Implant date: 10/11/21     Reason for evaluation: routine, ICD, Office  Cardiac device indication(s): cardiomyopathy, ischemic    Battery  AMIRA:  10.2 years     Interrogation Results  Atrial sensing: P wave: 3.9 mV  Atrial capture: 0.625 V @ 0.4 ms   Atrial lead impedance: 513 ohms  Ventricular sensing: R wave: 14.9 mV  Ventricular capture: 0.750 V @ 0.4 ms  Ventricular lead impedance: right  418 ohms; HV:  54/95 ohms    Parameters  Mode: AAIR<=>DDDR  Base Rate: 60/130    Diagnostic Data  Atrial paced: 64.2  %   Ventricular paced: 10.2 %  Mode switch: 0%  AT/AF [place]: <0.1%  AHR: 0  VHR: 1, NS 1 sec    Intrinsic: 75      Changes made: None    Conclusions: Normal device function. Follow up in 3 months.     Assessment:  Ischemic cardiomyopathy    Battery voltage voltage greater than 10-year    Follow-up with ICD clinic as per schedule appointment                  This document has been electronically signed by Oralia Wright MD on March 2, 2023 14:06 CST

## 2023-04-18 RX ORDER — METOPROLOL SUCCINATE 50 MG/1
50 TABLET, EXTENDED RELEASE ORAL DAILY
Qty: 90 TABLET | Refills: 3 | Status: SHIPPED | OUTPATIENT
Start: 2023-04-18

## 2023-04-25 ENCOUNTER — OFFICE VISIT (OUTPATIENT)
Dept: CARDIOLOGY | Facility: CLINIC | Age: 68
End: 2023-04-25
Payer: MEDICARE

## 2023-04-25 VITALS
HEIGHT: 65 IN | SYSTOLIC BLOOD PRESSURE: 132 MMHG | OXYGEN SATURATION: 98 % | BODY MASS INDEX: 21.33 KG/M2 | WEIGHT: 128 LBS | DIASTOLIC BLOOD PRESSURE: 78 MMHG | HEART RATE: 78 BPM

## 2023-04-25 DIAGNOSIS — I25.5 ISCHEMIC CARDIOMYOPATHY: Primary | ICD-10-CM

## 2023-04-25 DIAGNOSIS — E78.2 MIXED HYPERLIPIDEMIA: ICD-10-CM

## 2023-04-25 DIAGNOSIS — I10 ESSENTIAL HYPERTENSION: ICD-10-CM

## 2023-04-25 DIAGNOSIS — I25.708 CORONARY ARTERY DISEASE INVOLVING CORONARY BYPASS GRAFT OF NATIVE HEART WITH OTHER FORMS OF ANGINA PECTORIS: ICD-10-CM

## 2023-04-25 LAB
QT INTERVAL: 352 MS
QTC INTERVAL: 401 MS

## 2023-04-25 NOTE — PROGRESS NOTES
Andre Garcia Concepcion  68 y.o. male    4/25/2023     1. Ischemic cardiomyopathy    2. Coronary artery disease involving coronary bypass graft of native heart with other forms of angina pectoris    3. Mixed hyperlipidemia    4. Essential hypertension    5       exertional dyspnea    History of Present Illness:  Body mass index is 21.3 kg/m².  Within normal range no further recommendations    68 years old patient with history of CAD s/p CABG ventricular tachycardia s/p ICD hypertension presented for routine follow-up.  EKG no acute ST-T wave changes.  There are some changes in clinical condition and become more fatigue and exertional dyspnea which is new to him.  This could be anginal) given the history of diabetes.  Orthopnea PND or typical chest pain reported.  No nausea vomiting diarrhea polydipsia polyuria.  His hemoglobin A1c is significantly elevated he was asked by Dr. Nieves to increase dose of Actos but that causing significant lower extremity edema told him to discuss with Dr. Nieves to decreased other oral hypoglycemic or add new 1.  Have a very good cholesterol profile.  No intermittent cardiac issue reported.    Lipid April 2023 normal total cholesterol good HDL normal LDL  Hemoglobin A1c 8 .2      Monitor 4/26/2022    Clinical impression     Sinus rhythm with intermittent sinus tachycardia with intermittent paced rhythm without significant bradyarrhythmia or pause     #2 normal burden of premature atrial and ventricular complex.      Echo April 2022      • Estimated left ventricular EF = 51% Left ventricular ejection fraction appears to be 51 - 55%. Left ventricular systolic function is normal.  • Left ventricular diastolic function is consistent with (grade I) impaired relaxation.  • Estimated right ventricular systolic pressure from tricuspid regurgitation is normal (<35 mmHg).    Date of Procedure: 10/11/21     Referring Physician: Dr. Flor     /ELECTROPHYSIOLOGIST:             PROCEDURE(S) PERFORMED:    1. Removal of implantable cardioverter-defibrillator generator   2. Insertion of a new implantable cardioverter-defibrillator generator        3. Defibrillation threshold testing for ventricular fibrillation     INDICATIONS FOR PROCDEDURE:            ICD reached to AMIRA status    Echo 2017    · The left ventricular cavity is borderline dilated.  · Left ventricular systolic function is normal. Estimated EF = 50%.  · Left ventricular diastolic dysfunction (grade I) consistent with impaired relaxation.  · Mild tricuspid valve regurgitation is present.    Lipid November 2020    Component   Ref Range & Units 11 mo ago 2 yr ago 4 yr ago   Total Cholesterol   <200 mg/dL 142  146  160 R    Triglycerides   30 - 150 mg/dL 151 High   195 High   176 R    HDL Cholesterol   32 - 72 mg/dL 50  44  47 Low  R    LDL Cholesterol    5 - 99 mg/dL 68  68  82 R    Chol/HDL Ratio   4.2 - 5.6 2.8 Low   3.3 Low            SUBJECTIVE:    Allergies   Allergen Reactions   • Niacin And Related Other (See Comments)     Other reaction(s): Other (See Comments)  [place]         Past Medical History:   Diagnosis Date   • Diabetes mellitus    • Hyperlipidemia    • Hypertension    • Ischemic cardiomyopathy    • Myocardial infarction          Past Surgical History:   Procedure Laterality Date   • CARDIAC CATHETERIZATION      stents x3   • CARDIAC ELECTROPHYSIOLOGY PROCEDURE N/A 10/11/2021    Procedure: ICD DC generator change;  Surgeon: Oralia Wright MD;  Location: [facility] CATH INVASIVE LOCATION;  Service: Cardiology;  Laterality: N/A;  [employer]   • CORONARY ARTERY BYPASS GRAFT     • INSERT / REPLACE / REMOVE PACEMAKER           History reviewed. No pertinent family history.      Social History     Socioeconomic History   • Marital status: Single   Tobacco Use   • Smoking status: Never   • Smokeless tobacco: Never   Vaping Use   • Vaping Use: Never used   Substance and Sexual Activity   • Alcohol use: No   •  "Drug use: No   • Sexual activity: Defer         Current Outpatient Medications   Medication Sig Dispense Refill   • aspirin 81 MG EC tablet Take 1 tablet by mouth Daily.     • clopidogrel (PLAVIX) 75 MG tablet Take 1 tablet by mouth Daily. 90 tablet 3   • digoxin (LANOXIN) 125 MCG tablet Take 1 tablet by mouth Daily. 90 tablet 3   • glimepiride (AMARYL) 2 MG tablet Take 1 tablet by mouth 2 (two) times a day.     • losartan (COZAAR) 50 MG tablet Take 1 tablet by mouth Daily. 90 tablet 4   • metFORMIN (GLUCOPHAGE) 850 MG tablet Take 1 tablet by mouth 2 (Two) Times a Day With Meals.     • metoprolol succinate XL (TOPROL-XL) 50 MG 24 hr tablet Take 1 tablet by mouth Daily. 90 tablet 3   • rosuvastatin (CRESTOR) 20 MG tablet Take 10 mg by mouth Every Night.       No current facility-administered medications for this visit.           Review of Systems:     Constitutional:  Denies recent weight loss, weight gain,no change in exercise tolerance.     HENT:  Denies any hearing loss, epistaxisEyes: No blurring    Respiratory: Exertional dyspnea and fatigue    Cardiovascular: See H&P    Gastrointestinal:  Denies change in bowel habits and dyspepsia    Endocrine: Negative for cold intolerance, heat intolerance, polydipsia    Genitourinary: Negative.      Musculoskeletal: History of osteoarthritis    Skin:  Deniesrashes, or skin lesions.     Allergic/Immunologic: Negative.  Negative for environmental allergies    Neurological:  Denies any history of recurrent headaches, strokes,     Hematological: Denies any food allergies, seasonal allergies    Psychiatric/Behavioral: Denies any history of depression        OBJECTIVE:    Ht 165.1 cm (65\")   Wt 58.1 kg (128 lb)   BMI 21.30 kg/m²     Physical Exam:   Today dated 10/18/2022 no significant changes were noted in physical exam  Constitutional: Oriented to person, place, and time.  Well-developed and well-nourished. No distress.   HENT: Normocephalic.   Eyes: Conjunctivae are normal. " No scleral icterus.   Neck: Normal carotid pulses, no hepatojugular reflux and no JVD present.   Cardiovascular: Normal rate, regular rhythm, S1 normal, S2 normal, normal heart sounds and intact distal pulses.   Pulmonary/Chest: Effort normal and breath sounds normal. No respiratory distress.   Abdominal: Soft. Bowel sounds are normal. Exhibits no distension and no mass.   Musculoskeletal:  Exhibits no edema, no tenderness.   Neurological: Alert and awake.  Skin: Warm and dry.  Psychiatric: Normal mood and affect.        Procedures      Lab Results   Component Value Date    WBC 6.80 10/11/2021    HGB 15.5 10/11/2021    HCT 43.1 10/11/2021    MCV 86.0 10/11/2021     10/11/2021     Lab Results   Component Value Date    GLUCOSE 219 (H) 10/11/2021    BUN 13 04/14/2023    CREATININE 1.0 04/14/2023    EGFRIFNONA 83 10/11/2021    BCR 13.0 10/11/2021    CO2 26 04/14/2023    CALCIUM 9.6 04/14/2023    ALBUMIN 4.9 04/14/2023    AST 18 04/14/2023    ALT 14 04/14/2023     Lab Results   Component Value Date    CHOL 142 11/30/2020    CHOL 146 11/11/2019    CHOL 160 01/27/2017     Lab Results   Component Value Date    TRIG 154 (H) 04/14/2023    TRIG 125 09/30/2022    TRIG 227 (H) 12/14/2021     Lab Results   Component Value Date    HDL 58 04/14/2023    HDL 55 09/30/2022    HDL 47 12/14/2021     No components found for: LDLCALC  Lab Results   Component Value Date    LDL 53 04/14/2023    LDL 75 09/30/2022    LDL 62 12/14/2021     No results found for: HDLLDLRATIO  No components found for: CHOLHDL  Lab Results   Component Value Date    HGBA1C 8.2 (H) 04/14/2023     Lab Results   Component Value Date    TSH 3.02 04/14/2023           ASSESSMENT AND PLAN:    #1  Ischemic cardiomyopathy and ventricular tachycardia s/p ICD    Continue follow-up with ICD clinic as per schedule appointment        #2 CAD post CABG asymptomatic at the time of evaluation.  Currently is being treated with Crestor, Plavix and aspirin        #3 hypertension  good blood pressure continue losartan and metoprolol    #4 hyperlipidemia continue statin with good LDL    #5 exertional dyspnea  68 years old patient with concurrent medical problem of type 2 diabetes, hypertension, hyperlipidemia, CAD s/p CABG.  Patient with change in clinical condition described exertional dyspnea and fatigue.  That could be anginal equivalent given history of diabetes.  Will risk stratify with a stress Cardiolite procedure risk pros and cons discussed understand willing to proceed forward with        I spent 27 minutes caring for Andre on this date of service. This time includes time spent by me of counseling/coordination of care as relates to the presenting problem and any ordered procedures/tests as outlined above.           This document has been electronically signed by Oralia Wright MD on April 25, 2023 09:06 CDT      Diagnoses and all orders for this visit:    1. Ischemic cardiomyopathy (Primary)  -     ECG 12 Lead    2. Coronary artery disease involving coronary bypass graft of native heart with other forms of angina pectoris    3. Mixed hyperlipidemia    4. Essential hypertension          Oralia Wright MD  4/25/2023  09:06 CDT

## 2023-05-08 ENCOUNTER — HOSPITAL ENCOUNTER (OUTPATIENT)
Dept: NUCLEAR MEDICINE | Facility: HOSPITAL | Age: 68
Discharge: HOME | End: 2023-05-08
Payer: MEDICARE

## 2023-05-08 ENCOUNTER — HOSPITAL ENCOUNTER (OUTPATIENT)
Dept: CARDIOLOGY | Facility: HOSPITAL | Age: 68
Discharge: HOME | End: 2023-05-08
Payer: MEDICARE

## 2023-05-08 DIAGNOSIS — I25.708 CORONARY ARTERY DISEASE INVOLVING CORONARY BYPASS GRAFT OF NATIVE HEART WITH OTHER FORMS OF ANGINA PECTORIS: ICD-10-CM

## 2023-05-08 DIAGNOSIS — I25.5 ISCHEMIC CARDIOMYOPATHY: ICD-10-CM

## 2023-05-08 LAB
BH CV REST NUCLEAR ISOTOPE DOSE: 10.8 MCI
BH CV STRESS BP STAGE 1: NORMAL
BH CV STRESS BP STAGE 2: NORMAL
BH CV STRESS DURATION MIN STAGE 1: 3
BH CV STRESS DURATION MIN STAGE 2: 1
BH CV STRESS DURATION SEC STAGE 1: 0
BH CV STRESS DURATION SEC STAGE 2: 31
BH CV STRESS GRADE STAGE 1: 10
BH CV STRESS GRADE STAGE 2: 12
BH CV STRESS HR STAGE 1: 122
BH CV STRESS HR STAGE 2: 142
BH CV STRESS METS STAGE 1: 5
BH CV STRESS METS STAGE 2: 7.5
BH CV STRESS NUCLEAR ISOTOPE DOSE: 36.6 MCI
BH CV STRESS PROTOCOL 1: NORMAL
BH CV STRESS RECOVERY BP: NORMAL MMHG
BH CV STRESS RECOVERY HR: 76 BPM
BH CV STRESS SPEED STAGE 1: 1.7
BH CV STRESS SPEED STAGE 2: 2.5
BH CV STRESS STAGE 1: 1
BH CV STRESS STAGE 2: 2
MAXIMAL PREDICTED HEART RATE: 154 BPM
PERCENT MAX PREDICTED HR: 92.21 %
STRESS BASELINE BP: NORMAL MMHG
STRESS BASELINE HR: 77 BPM
STRESS PERCENT HR: 108 %
STRESS POST PEAK BP: NORMAL MMHG
STRESS POST PEAK HR: 142 BPM
STRESS TARGET HR: 131 BPM

## 2023-05-08 PROCEDURE — 0 TECHNETIUM SESTAMIBI: Performed by: INTERNAL MEDICINE

## 2023-05-08 PROCEDURE — 93017 CV STRESS TEST TRACING ONLY: CPT

## 2023-05-08 PROCEDURE — 93018 CV STRESS TEST I&R ONLY: CPT | Performed by: INTERNAL MEDICINE

## 2023-05-08 PROCEDURE — 78452 HT MUSCLE IMAGE SPECT MULT: CPT | Performed by: INTERNAL MEDICINE

## 2023-05-08 PROCEDURE — 78452 HT MUSCLE IMAGE SPECT MULT: CPT

## 2023-05-08 PROCEDURE — A9500 TC99M SESTAMIBI: HCPCS | Performed by: INTERNAL MEDICINE

## 2023-05-08 RX ADMIN — TECHNETIUM TC 99M SESTAMIBI 1 DOSE: 1 INJECTION INTRAVENOUS at 08:29

## 2023-05-08 RX ADMIN — TECHNETIUM TC 99M SESTAMIBI 1 DOSE: 1 INJECTION INTRAVENOUS at 09:33

## 2023-05-10 ENCOUNTER — TELEPHONE (OUTPATIENT)
Dept: CARDIOLOGY | Facility: CLINIC | Age: 68
End: 2023-05-10
Payer: MEDICARE

## 2023-05-10 NOTE — TELEPHONE ENCOUNTER
Attempted to contact patient per Dr. Wright about results. Patient was unavailable and left voicemail.      ----- Message from Oralia Wright MD sent at 5/10/2023  2:03 PM CDT -----  Normal stress test  ----- Message -----  From: Carlo Contreras MD  Sent: 5/8/2023   6:48 PM CDT  To: Oralia Wright MD

## 2023-05-10 NOTE — TELEPHONE ENCOUNTER
Attempted to contact patient per Dr. Wright about results. Patient voiced understanding.        ----- Message from Oralia Wright MD sent at 5/10/2023  2:03 PM CDT -----  Normal stress test  ----- Message -----  From: Carlo Contreras MD  Sent: 5/8/2023   6:48 PM CDT  To: Oralia Wright MD

## 2023-06-07 ENCOUNTER — CLINICAL SUPPORT (OUTPATIENT)
Dept: CARDIOLOGY | Facility: CLINIC | Age: 68
End: 2023-06-07
Payer: MEDICARE

## 2023-06-07 DIAGNOSIS — I47.20 VT (VENTRICULAR TACHYCARDIA): ICD-10-CM

## 2023-06-07 DIAGNOSIS — I25.5 ISCHEMIC CARDIOMYOPATHY: Primary | ICD-10-CM

## 2023-06-07 NOTE — PROGRESS NOTES
Pacemaker Evaluation Report  June 7, 2023    Primary Cardiologist: Dr. Wright  Implanting MD: Dr. Wright  :mii Model: Cobalt  [id number] Serial Number: [id number]  Implant date: 10/11/21     Reason for evaluation: routine, ICD, Office  Cardiac device indication(s): cardiomyopathy, ischemic    Battery  AMIRA:  9.8 years     Interrogation Results  Atrial sensing: P wave: 4.4 mV  Atrial capture: 0.5 V @ 0.4 ms   Atrial lead impedance: 475 ohms  Ventricular sensing: R wave: 15.3 mV  Ventricular capture: 0.750 V @ 0.4 ms  Ventricular lead impedance: right  399 ohms; HV:  51/88 ohms    Parameters  Mode: AAIR<=>DDDR  Base Rate: 60/130    Diagnostic Data  Atrial paced: 71.3  %   Ventricular paced: 18.8 %  Mode switch: 0%  AT/AF [place]: <0.1%  AHR: 0  VHR: 1, NS 1 sec    Intrinsic: 70      Changes made: None    Conclusions: Normal device function. Follow up in 3 months.     Assessment:  Ischemic cardiomyopathy    Ventricular tachycardia    Battery voltage voltage greater than 9.8-years    Follow-up with ICD clinic as per schedule appointment                        This document has been electronically signed by Oralia Wright MD on June 8, 2023 13:47 CDT

## 2023-06-08 PROBLEM — I47.20 VT (VENTRICULAR TACHYCARDIA): Status: ACTIVE | Noted: 2023-06-08

## 2023-09-06 ENCOUNTER — CLINICAL SUPPORT (OUTPATIENT)
Dept: CARDIOLOGY | Facility: CLINIC | Age: 68
End: 2023-09-06
Payer: MEDICARE

## 2023-09-06 DIAGNOSIS — I25.5 ISCHEMIC CARDIOMYOPATHY: ICD-10-CM

## 2023-09-06 DIAGNOSIS — I47.20 VT (VENTRICULAR TACHYCARDIA): Primary | ICD-10-CM

## 2023-09-06 PROCEDURE — 93289 INTERROG DEVICE EVAL HEART: CPT | Performed by: INTERNAL MEDICINE

## 2023-09-06 NOTE — PROGRESS NOTES
Pacemaker Evaluation Report  September 6, 2023    Primary Cardiologist: Dr. Wright  Implanting MD: Dr. Wright  :[employer] Model: Cobalt DR [id number] Serial Number: [id number]  Implant date: 10/11/21     Reason for evaluation: routine, ICD, Office  Cardiac device indication(s): cardiomyopathy, ischemic    Battery  AMIRA:  9.6 years     Interrogation Results  Atrial sensing: P wave: 5.8 mV  Atrial capture: 0.5 V @ 0.4 ms   Atrial lead impedance: 513 ohms  Ventricular sensing: R wave: 19.6 mV  Ventricular capture: 0.750 V @ 0.4 ms  Ventricular lead impedance: right  456 ohms; HV:  53/94 ohms    Parameters  Mode: AAIR<=>DDDR  Base Rate: 60/130    Diagnostic Data  Atrial paced: 74  %   Ventricular paced: 28 %  Mode switch: 0%  AT/AF [place]: <0.1%  AHR: 0  VHR: 0    Intrinsic: 65      Changes made: None    Conclusions: Normal device function. Follow up in 3 months.     Assessment:  Ischemic cardiomyopathy    Ventricular tachycardia    Battery voltage voltage greater than 9.6 years    Follow-up with ICD clinic as per schedule appointment                                  This document has been electronically signed by Megan Bray RN on September 6, 2023 10:44 CDT

## (undated) DEVICE — PENCL E/S HNDSWTCH SMOKEEVAC HOLSTR 10FT

## (undated) DEVICE — 9135-LP 3M UNIV ELECTROSURGICA L PLATE, STD, CORDED, 40/CASE: Brand: 3M™

## (undated) DEVICE — PK PM 60

## (undated) DEVICE — ELECTRODE,RT,STRESS,FOAM,50PK: Brand: MEDLINE